# Patient Record
Sex: MALE | Race: WHITE | Employment: OTHER | URBAN - METROPOLITAN AREA
[De-identification: names, ages, dates, MRNs, and addresses within clinical notes are randomized per-mention and may not be internally consistent; named-entity substitution may affect disease eponyms.]

---

## 2017-01-03 ENCOUNTER — NURSE ONLY (OUTPATIENT)
Dept: FAMILY MEDICINE CLINIC | Facility: CLINIC | Age: 82
End: 2017-01-03

## 2017-01-03 ENCOUNTER — OFFICE VISIT (OUTPATIENT)
Dept: CARDIOLOGY CLINIC | Facility: CLINIC | Age: 82
End: 2017-01-03

## 2017-01-03 ENCOUNTER — TELEPHONE (OUTPATIENT)
Dept: FAMILY MEDICINE CLINIC | Facility: CLINIC | Age: 82
End: 2017-01-03

## 2017-01-03 VITALS
SYSTOLIC BLOOD PRESSURE: 170 MMHG | BODY MASS INDEX: 26.4 KG/M2 | DIASTOLIC BLOOD PRESSURE: 80 MMHG | HEIGHT: 63 IN | RESPIRATION RATE: 18 BRPM | WEIGHT: 149 LBS | HEART RATE: 72 BPM

## 2017-01-03 DIAGNOSIS — I42.9 CARDIOMYOPATHY, UNSPECIFIED TYPE (HCC): Primary | ICD-10-CM

## 2017-01-03 DIAGNOSIS — G63 VITAMIN B12 DEFICIENCY NEUROPATHY (HCC): ICD-10-CM

## 2017-01-03 DIAGNOSIS — E78.2 MIXED HYPERLIPIDEMIA: ICD-10-CM

## 2017-01-03 DIAGNOSIS — I10 ESSENTIAL HYPERTENSION: Chronic | ICD-10-CM

## 2017-01-03 DIAGNOSIS — I25.10 ATHEROSCLEROSIS OF NATIVE CORONARY ARTERY OF NATIVE HEART WITHOUT ANGINA PECTORIS: Primary | Chronic | ICD-10-CM

## 2017-01-03 DIAGNOSIS — E53.8 VITAMIN B12 DEFICIENCY NEUROPATHY (HCC): ICD-10-CM

## 2017-01-03 PROCEDURE — 99212 OFFICE O/P EST SF 10 MIN: CPT | Performed by: INTERNAL MEDICINE

## 2017-01-03 PROCEDURE — 99204 OFFICE O/P NEW MOD 45 MIN: CPT | Performed by: INTERNAL MEDICINE

## 2017-01-03 PROCEDURE — 96372 THER/PROPH/DIAG INJ SC/IM: CPT | Performed by: FAMILY MEDICINE

## 2017-01-03 RX ORDER — HYDROCHLOROTHIAZIDE 12.5 MG/1
12.5 CAPSULE, GELATIN COATED ORAL DAILY
Qty: 90 CAPSULE | Refills: 3 | Status: SHIPPED | OUTPATIENT
Start: 2017-01-03 | End: 2017-01-24

## 2017-01-03 RX ORDER — CYANOCOBALAMIN 1000 UG/ML
1000 INJECTION INTRAMUSCULAR; SUBCUTANEOUS
Status: SHIPPED | OUTPATIENT
Start: 2017-01-03 | End: 2017-01-31

## 2017-01-03 RX ADMIN — CYANOCOBALAMIN 1000 MCG: 1000 INJECTION INTRAMUSCULAR; SUBCUTANEOUS at 10:30:00

## 2017-01-03 NOTE — PATIENT INSTRUCTIONS
Avoid added salt  Start hydrochlorothiazide 12.5 mg daily  Check blood pressure daily and record after resting 5 minutes  Bring blood pressure machine to follow-up visit when seeing LYNNE Perez in 1 week  Get BMP blood test in 1 week  Continue other medicati

## 2017-01-03 NOTE — TELEPHONE ENCOUNTER
Requesting referral for cardiology, 2 visits. Pt has appt w/ Ana on 01/10 for 1 week f/u and appt 07/11 for 6 mo f/u w/ Dr. Patrizia Spencer.

## 2017-01-03 NOTE — PROGRESS NOTES
Patient is here for his weekly vitamin b 12 injection. Patient tolerated injection well, no adverse reaction noted. Patient is to follow up next week for another dose.      Notes Recorded by Jesus Dominguez DO on 11/29/2016 at 8:10 AM  Patient has a num

## 2017-01-03 NOTE — H&P
Thalia Cisneros is a 80year old male. HPI:   This is a pleasant 45-year-old with history of coronary disease prior MI and bare metal stent known hypertension and EF of 40-45% last seen in 2012.   He now presents for cardiac reassessment after his blood Unspecified essential hypertension    • Other and unspecified hyperlipidemia    • Myocardial infarction [facility]) 2011   • Arteriosclerosis of coronary artery    • Aortic valve regurgitation      moderate   • Mitral valve regurgitation      moderate   • Diasto should have a BMP at that time to see if diuretics can be further adjusted. If he is not tolerating diuretics or his blood pressure remains high lisinopril can be increased or  amlodipine can be added. - Basic Metabolic Panel (8); Future    3.  Mixed hype

## 2017-01-04 NOTE — TELEPHONE ENCOUNTER
Dr. [facility], please see pending referral and advise. Thanks.

## 2017-01-10 ENCOUNTER — OFFICE VISIT (OUTPATIENT)
Dept: CARDIOLOGY CLINIC | Facility: CLINIC | Age: 82
End: 2017-01-10

## 2017-01-10 ENCOUNTER — NURSE ONLY (OUTPATIENT)
Dept: FAMILY MEDICINE CLINIC | Facility: CLINIC | Age: 82
End: 2017-01-10

## 2017-01-10 VITALS
BODY MASS INDEX: 26.58 KG/M2 | DIASTOLIC BLOOD PRESSURE: 60 MMHG | WEIGHT: 150 LBS | HEIGHT: 63 IN | HEART RATE: 72 BPM | SYSTOLIC BLOOD PRESSURE: 152 MMHG

## 2017-01-10 DIAGNOSIS — E53.8 VITAMIN B12 DEFICIENCY: Primary | ICD-10-CM

## 2017-01-10 DIAGNOSIS — I10 ESSENTIAL HYPERTENSION: Primary | Chronic | ICD-10-CM

## 2017-01-10 PROCEDURE — 99213 OFFICE O/P EST LOW 20 MIN: CPT | Performed by: NURSE PRACTITIONER

## 2017-01-10 PROCEDURE — 96372 THER/PROPH/DIAG INJ SC/IM: CPT | Performed by: FAMILY MEDICINE

## 2017-01-10 PROCEDURE — 99212 OFFICE O/P EST SF 10 MIN: CPT | Performed by: NURSE PRACTITIONER

## 2017-01-10 RX ADMIN — CYANOCOBALAMIN 1000 MCG: 1000 INJECTION INTRAMUSCULAR; SUBCUTANEOUS at 10:04:00

## 2017-01-10 NOTE — PATIENT INSTRUCTIONS
1. Blood test then if its ok will increase HCTZ to 25mg daily  2. Check BP daily after sitting quietly   3.  Low salt diet

## 2017-01-10 NOTE — PROGRESS NOTES
Allison Kamara is a 80year old male. Patient presents with: Follow - Up    HPI:   Patient comes in today for a checkup of for his blood pressure. He has a history of coronary disease with bare-metal stent EF around 40-45% lasting 2012.   He had an echo artery    • Aortic valve regurgitation      moderate   • Mitral valve regurgitation      moderate   • Diastolic dysfunction      grade 1   • Cardiomyopathy (HCC)      ejection fraction 40-45%      Social History:    Smoking Status: Never Smoker

## 2017-01-11 ENCOUNTER — APPOINTMENT (OUTPATIENT)
Dept: LAB | Age: 82
End: 2017-01-11
Attending: INTERNAL MEDICINE
Payer: MEDICAID

## 2017-01-11 DIAGNOSIS — I10 ESSENTIAL HYPERTENSION: Chronic | ICD-10-CM

## 2017-01-11 DIAGNOSIS — E78.2 MIXED HYPERLIPIDEMIA: ICD-10-CM

## 2017-01-11 DIAGNOSIS — I25.10 ATHEROSCLEROSIS OF NATIVE CORONARY ARTERY OF NATIVE HEART WITHOUT ANGINA PECTORIS: Chronic | ICD-10-CM

## 2017-01-11 LAB
ANION GAP SERPL CALC-SCNC: 9 MMOL/L (ref 0–18)
BUN SERPL-MCNC: 11 MG/DL (ref 8–20)
BUN/CREAT SERPL: 13.3 (ref 10–20)
CALCIUM SERPL-MCNC: 9.2 MG/DL (ref 8.5–10.5)
CHLORIDE SERPL-SCNC: 97 MMOL/L (ref 95–110)
CO2 SERPL-SCNC: 28 MMOL/L (ref 22–32)
CREAT SERPL-MCNC: 0.83 MG/DL (ref 0.5–1.5)
GLUCOSE SERPL-MCNC: 119 MG/DL (ref 70–99)
OSMOLALITY UR CALC.SUM OF ELEC: 279 MOSM/KG (ref 275–295)
POTASSIUM SERPL-SCNC: 4.2 MMOL/L (ref 3.3–5.1)
SODIUM SERPL-SCNC: 134 MMOL/L (ref 136–144)

## 2017-01-11 PROCEDURE — 36415 COLL VENOUS BLD VENIPUNCTURE: CPT

## 2017-01-11 PROCEDURE — 80048 BASIC METABOLIC PNL TOTAL CA: CPT

## 2017-01-13 ENCOUNTER — TELEPHONE (OUTPATIENT)
Dept: CARDIOLOGY CLINIC | Facility: CLINIC | Age: 82
End: 2017-01-13

## 2017-01-13 DIAGNOSIS — E87.1 SODIUM (NA) DEFICIENCY: Primary | ICD-10-CM

## 2017-01-13 NOTE — TELEPHONE ENCOUNTER
----- Message from LYNNE Gunter sent at 1/13/2017 10:25 AM CST -----  Increase HCTZ to 25mg and repeat BMP again in 1 wk, will need to closely watch tomasz Canchola call daughter with results number in [facility]

## 2017-01-16 RX ORDER — HYDROCHLOROTHIAZIDE 12.5 MG/1
12.5 CAPSULE, GELATIN COATED ORAL 2 TIMES DAILY
Qty: 90 CAPSULE | Refills: 3 | Status: CANCELLED
Start: 2017-01-16

## 2017-01-17 ENCOUNTER — NURSE ONLY (OUTPATIENT)
Dept: FAMILY MEDICINE CLINIC | Facility: CLINIC | Age: 82
End: 2017-01-17

## 2017-01-17 DIAGNOSIS — E53.8 VITAMIN B 12 DEFICIENCY: Primary | ICD-10-CM

## 2017-01-17 PROCEDURE — 96372 THER/PROPH/DIAG INJ SC/IM: CPT | Performed by: FAMILY MEDICINE

## 2017-01-17 RX ADMIN — CYANOCOBALAMIN 1000 MCG: 1000 INJECTION INTRAMUSCULAR; SUBCUTANEOUS at 15:23:00

## 2017-01-17 NOTE — PROGRESS NOTES
Patient is here for his vitamin b-12 injection, verified name,, and medication.  Patient tolerated injection well

## 2017-01-24 ENCOUNTER — APPOINTMENT (OUTPATIENT)
Dept: LAB | Age: 82
End: 2017-01-24
Attending: INTERNAL MEDICINE
Payer: MEDICAID

## 2017-01-24 ENCOUNTER — OFFICE VISIT (OUTPATIENT)
Dept: FAMILY MEDICINE CLINIC | Facility: CLINIC | Age: 82
End: 2017-01-24

## 2017-01-24 VITALS
WEIGHT: 148 LBS | BODY MASS INDEX: 26 KG/M2 | HEART RATE: 67 BPM | DIASTOLIC BLOOD PRESSURE: 68 MMHG | SYSTOLIC BLOOD PRESSURE: 147 MMHG

## 2017-01-24 DIAGNOSIS — I10 ESSENTIAL HYPERTENSION WITH GOAL BLOOD PRESSURE LESS THAN 130/80: Primary | ICD-10-CM

## 2017-01-24 DIAGNOSIS — I42.9 CARDIOMYOPATHY, UNSPECIFIED TYPE (HCC): ICD-10-CM

## 2017-01-24 DIAGNOSIS — G57.93 NEUROPATHY OF BOTH FEET: ICD-10-CM

## 2017-01-24 DIAGNOSIS — E87.1 SODIUM (NA) DEFICIENCY: ICD-10-CM

## 2017-01-24 DIAGNOSIS — E78.2 MIXED HYPERLIPIDEMIA: ICD-10-CM

## 2017-01-24 LAB
ANION GAP SERPL CALC-SCNC: 9 MMOL/L (ref 0–18)
BUN SERPL-MCNC: 13 MG/DL (ref 8–20)
BUN/CREAT SERPL: 14.4 (ref 10–20)
CALCIUM SERPL-MCNC: 9.5 MG/DL (ref 8.5–10.5)
CHLORIDE SERPL-SCNC: 95 MMOL/L (ref 95–110)
CO2 SERPL-SCNC: 29 MMOL/L (ref 22–32)
CREAT SERPL-MCNC: 0.9 MG/DL (ref 0.5–1.5)
GLUCOSE SERPL-MCNC: 91 MG/DL (ref 70–99)
OSMOLALITY UR CALC.SUM OF ELEC: 276 MOSM/KG (ref 275–295)
POTASSIUM SERPL-SCNC: 3.6 MMOL/L (ref 3.3–5.1)
SODIUM SERPL-SCNC: 133 MMOL/L (ref 136–144)

## 2017-01-24 PROCEDURE — 99212 OFFICE O/P EST SF 10 MIN: CPT | Performed by: FAMILY MEDICINE

## 2017-01-24 PROCEDURE — 36415 COLL VENOUS BLD VENIPUNCTURE: CPT

## 2017-01-24 PROCEDURE — 99214 OFFICE O/P EST MOD 30 MIN: CPT | Performed by: FAMILY MEDICINE

## 2017-01-24 PROCEDURE — 80048 BASIC METABOLIC PNL TOTAL CA: CPT

## 2017-01-24 RX ORDER — CLOPIDOGREL BISULFATE 75 MG/1
75 TABLET ORAL
Qty: 90 TABLET | Refills: 3 | Status: SHIPPED | OUTPATIENT
Start: 2017-01-24 | End: 2018-02-25

## 2017-01-24 RX ORDER — ENALAPRIL MALEATE 10 MG/1
10 TABLET ORAL 2 TIMES DAILY
Qty: 180 TABLET | Refills: 3 | Status: SHIPPED | OUTPATIENT
Start: 2017-01-24 | End: 2018-04-09

## 2017-01-24 RX ORDER — METOPROLOL SUCCINATE 100 MG/1
100 TABLET, EXTENDED RELEASE ORAL DAILY
Qty: 90 TABLET | Refills: 3 | Status: SHIPPED | OUTPATIENT
Start: 2017-01-24 | End: 2018-03-30

## 2017-01-24 RX ORDER — SIMVASTATIN 40 MG
40 TABLET ORAL NIGHTLY
Qty: 90 TABLET | Refills: 3 | Status: SHIPPED | OUTPATIENT
Start: 2017-01-24 | End: 2018-04-09

## 2017-01-24 RX ORDER — HYDROCHLOROTHIAZIDE 25 MG/1
25 TABLET ORAL DAILY
Qty: 90 TABLET | Refills: 3 | Status: SHIPPED | OUTPATIENT
Start: 2017-01-24 | End: 2017-01-24

## 2017-01-24 RX ORDER — AMITRIPTYLINE HYDROCHLORIDE 10 MG/1
TABLET, FILM COATED ORAL
Qty: 90 TABLET | Refills: 3 | Status: SHIPPED | OUTPATIENT
Start: 2017-01-24 | End: 2018-02-25

## 2017-01-24 RX ORDER — HYDROCHLOROTHIAZIDE 25 MG/1
25 TABLET ORAL DAILY
Qty: 90 TABLET | Refills: 3 | Status: SHIPPED | OUTPATIENT
Start: 2017-01-24 | End: 2018-03-30

## 2017-01-24 RX ORDER — RANITIDINE 300 MG/1
300 TABLET ORAL NIGHTLY
Qty: 90 TABLET | Refills: 3 | Status: SHIPPED | OUTPATIENT
Start: 2017-01-24 | End: 2017-09-22

## 2017-01-24 NOTE — PROGRESS NOTES
Has some balance issues. bp bertter. Cards note appreciated. No sob   Cp  No nausea      Patient's past medical surgical family social history was reviewed. Review of Systems  Cardiovascular: no chest pain, irregular heartbeat, or palpitations.   Cons

## 2017-04-26 ENCOUNTER — TELEPHONE (OUTPATIENT)
Dept: FAMILY MEDICINE CLINIC | Facility: CLINIC | Age: 82
End: 2017-04-26

## 2017-04-26 NOTE — TELEPHONE ENCOUNTER
Wolof speaking pt-Pt's daughter states Pt is currently in [address] since last month, states they are mailing him medication to [address].  Pt's daughter states [address] is requesting a letter that states pt is under his care, and medication he is taking.

## 2017-08-28 ENCOUNTER — OFFICE VISIT (OUTPATIENT)
Dept: FAMILY MEDICINE CLINIC | Facility: CLINIC | Age: 82
End: 2017-08-28

## 2017-08-28 VITALS
HEIGHT: 63 IN | WEIGHT: 146 LBS | DIASTOLIC BLOOD PRESSURE: 86 MMHG | HEART RATE: 69 BPM | BODY MASS INDEX: 25.87 KG/M2 | SYSTOLIC BLOOD PRESSURE: 165 MMHG

## 2017-08-28 DIAGNOSIS — E78.2 MIXED HYPERLIPIDEMIA: ICD-10-CM

## 2017-08-28 DIAGNOSIS — R20.0 BILATERAL LEG NUMBNESS: ICD-10-CM

## 2017-08-28 DIAGNOSIS — M51.36 DEGENERATIVE LUMBAR DISC: ICD-10-CM

## 2017-08-28 DIAGNOSIS — G47.30 SEVERE SLEEP APNEA: Primary | ICD-10-CM

## 2017-08-28 DIAGNOSIS — I10 ESSENTIAL HYPERTENSION WITH GOAL BLOOD PRESSURE LESS THAN 130/80: ICD-10-CM

## 2017-08-28 PROCEDURE — 99214 OFFICE O/P EST MOD 30 MIN: CPT | Performed by: FAMILY MEDICINE

## 2017-08-28 PROCEDURE — 99212 OFFICE O/P EST SF 10 MIN: CPT | Performed by: FAMILY MEDICINE

## 2017-08-28 NOTE — PROGRESS NOTES
Both legs are numb. All the time originally was just in the feet now its the whole leg. Patient has a history of lumbar disc disease:  L1-L2: No significant disc/facet abnormality, spinal stenosis, or foraminal  stenosis.   L2-L3: No significant disc/face evaluation and the patient that it is very important that he uses CPAP mask. This could be life-threatening disease as he has severe sleep apnea severe uncontrolled hypertension as result of not being compliant with the CPAP mask.   Risk for stroke heart a reflexes were two out of four at the patellas and Achilles bilaterally. Dorsalis pedis and popliteal pulses were present bilateral lower extremities. Range of motion of the lumbar spine was not limited due to discomfort.   Heart was regular rate and rhyth

## 2017-09-02 ENCOUNTER — APPOINTMENT (OUTPATIENT)
Dept: LAB | Age: 82
End: 2017-09-02
Attending: FAMILY MEDICINE
Payer: MEDICAID

## 2017-09-02 DIAGNOSIS — E78.2 MIXED HYPERLIPIDEMIA: ICD-10-CM

## 2017-09-02 LAB
ALBUMIN SERPL BCP-MCNC: 4.3 G/DL (ref 3.5–4.8)
ALBUMIN/GLOB SERPL: 1.3 {RATIO} (ref 1–2)
ALP SERPL-CCNC: 91 U/L (ref 32–100)
ALT SERPL-CCNC: 16 U/L (ref 17–63)
ANION GAP SERPL CALC-SCNC: 10 MMOL/L (ref 0–18)
AST SERPL-CCNC: 21 U/L (ref 15–41)
BILIRUB SERPL-MCNC: 0.9 MG/DL (ref 0.3–1.2)
BUN SERPL-MCNC: 14 MG/DL (ref 8–20)
BUN/CREAT SERPL: 14.9 (ref 10–20)
CALCIUM SERPL-MCNC: 9.9 MG/DL (ref 8.5–10.5)
CHLORIDE SERPL-SCNC: 99 MMOL/L (ref 95–110)
CHOLEST SERPL-MCNC: 206 MG/DL (ref 110–200)
CO2 SERPL-SCNC: 27 MMOL/L (ref 22–32)
CREAT SERPL-MCNC: 0.94 MG/DL (ref 0.5–1.5)
GLOBULIN PLAS-MCNC: 3.2 G/DL (ref 2.5–3.7)
GLUCOSE SERPL-MCNC: 113 MG/DL (ref 70–99)
HDLC SERPL-MCNC: 45 MG/DL
LDLC SERPL CALC-MCNC: 118 MG/DL (ref 0–99)
NONHDLC SERPL-MCNC: 161 MG/DL
OSMOLALITY UR CALC.SUM OF ELEC: 283 MOSM/KG (ref 275–295)
POTASSIUM SERPL-SCNC: 4 MMOL/L (ref 3.3–5.1)
PROT SERPL-MCNC: 7.5 G/DL (ref 5.9–8.4)
SODIUM SERPL-SCNC: 136 MMOL/L (ref 136–144)
TRIGL SERPL-MCNC: 215 MG/DL (ref 1–149)

## 2017-09-02 PROCEDURE — 80053 COMPREHEN METABOLIC PANEL: CPT

## 2017-09-02 PROCEDURE — 80061 LIPID PANEL: CPT

## 2017-09-02 PROCEDURE — 36415 COLL VENOUS BLD VENIPUNCTURE: CPT

## 2017-09-06 ENCOUNTER — TELEPHONE (OUTPATIENT)
Dept: OTHER | Age: 82
End: 2017-09-06

## 2017-09-06 NOTE — TELEPHONE ENCOUNTER
Notes Recorded by Tarsha May DO on 9/5/2017 at 12:50 PM CDT  Cholesterol is still high.  Confirm the patient has been taking his cholesterol medication nightly.  Blood sugar is mildly elevated as before.  Liver kidney tests are fine.  Repeat same

## 2017-09-06 NOTE — TELEPHONE ENCOUNTER
With Divehi interpretor advised son Shelly Mando (hipaa on file) of Dr. Tessie Chang note. Son verbalized understanding. Son states patient takes 3 medications every night. Son will verify with patient which medications he takes at night and will let us know.

## 2017-09-22 ENCOUNTER — OFFICE VISIT (OUTPATIENT)
Dept: FAMILY MEDICINE CLINIC | Facility: CLINIC | Age: 82
End: 2017-09-22

## 2017-09-22 VITALS
BODY MASS INDEX: 26 KG/M2 | SYSTOLIC BLOOD PRESSURE: 133 MMHG | WEIGHT: 147 LBS | HEART RATE: 63 BPM | TEMPERATURE: 98 F | DIASTOLIC BLOOD PRESSURE: 64 MMHG

## 2017-09-22 DIAGNOSIS — K21.9 GASTROESOPHAGEAL REFLUX DISEASE, ESOPHAGITIS PRESENCE NOT SPECIFIED: Primary | ICD-10-CM

## 2017-09-22 DIAGNOSIS — I10 ESSENTIAL HYPERTENSION WITH GOAL BLOOD PRESSURE LESS THAN 130/80: ICD-10-CM

## 2017-09-22 DIAGNOSIS — Z23 NEED FOR VACCINATION: ICD-10-CM

## 2017-09-22 PROCEDURE — 90653 IIV ADJUVANT VACCINE IM: CPT | Performed by: FAMILY MEDICINE

## 2017-09-22 PROCEDURE — 99214 OFFICE O/P EST MOD 30 MIN: CPT | Performed by: FAMILY MEDICINE

## 2017-09-22 PROCEDURE — 99212 OFFICE O/P EST SF 10 MIN: CPT | Performed by: FAMILY MEDICINE

## 2017-09-22 PROCEDURE — 90471 IMMUNIZATION ADMIN: CPT | Performed by: FAMILY MEDICINE

## 2017-09-22 RX ORDER — PANTOPRAZOLE SODIUM 20 MG/1
20 TABLET, DELAYED RELEASE ORAL
Qty: 90 TABLET | Refills: 1 | Status: SHIPPED | OUTPATIENT
Start: 2017-09-22 | End: 2017-10-06

## 2017-09-22 NOTE — PROGRESS NOTES
Epigastric pain  Taking ranitidine   Not a lot of coffe. No sob    Tolerating his mediciaton for bp  No chest pain. Patient's past medical surgical family social history was reviewed.     Review of Systems  Allergic: no environmental allergies or food

## 2017-10-06 ENCOUNTER — APPOINTMENT (OUTPATIENT)
Dept: LAB | Age: 82
End: 2017-10-06
Attending: FAMILY MEDICINE
Payer: MEDICAID

## 2017-10-06 ENCOUNTER — OFFICE VISIT (OUTPATIENT)
Dept: FAMILY MEDICINE CLINIC | Facility: CLINIC | Age: 82
End: 2017-10-06

## 2017-10-06 VITALS
HEART RATE: 61 BPM | BODY MASS INDEX: 25 KG/M2 | SYSTOLIC BLOOD PRESSURE: 155 MMHG | WEIGHT: 143 LBS | DIASTOLIC BLOOD PRESSURE: 72 MMHG

## 2017-10-06 DIAGNOSIS — I25.10 ATHEROSCLEROSIS OF NATIVE CORONARY ARTERY OF NATIVE HEART WITHOUT ANGINA PECTORIS: Chronic | ICD-10-CM

## 2017-10-06 DIAGNOSIS — R10.13 EPIGASTRIC PAIN: ICD-10-CM

## 2017-10-06 DIAGNOSIS — I42.9 CARDIOMYOPATHY, UNSPECIFIED TYPE (HCC): ICD-10-CM

## 2017-10-06 DIAGNOSIS — K21.9 GASTROESOPHAGEAL REFLUX DISEASE, ESOPHAGITIS PRESENCE NOT SPECIFIED: Primary | ICD-10-CM

## 2017-10-06 DIAGNOSIS — I34.0 MITRAL VALVE INSUFFICIENCY, UNSPECIFIED ETIOLOGY: ICD-10-CM

## 2017-10-06 PROCEDURE — 93005 ELECTROCARDIOGRAM TRACING: CPT

## 2017-10-06 PROCEDURE — 93010 ELECTROCARDIOGRAM REPORT: CPT | Performed by: FAMILY MEDICINE

## 2017-10-06 PROCEDURE — 99212 OFFICE O/P EST SF 10 MIN: CPT | Performed by: FAMILY MEDICINE

## 2017-10-06 PROCEDURE — 99214 OFFICE O/P EST MOD 30 MIN: CPT | Performed by: FAMILY MEDICINE

## 2017-10-06 RX ORDER — PANTOPRAZOLE SODIUM 40 MG/1
40 TABLET, DELAYED RELEASE ORAL
Qty: 90 TABLET | Refills: 0 | Status: SHIPPED | OUTPATIENT
Start: 2017-10-06 | End: 2017-10-17

## 2017-10-06 NOTE — PROGRESS NOTES
Epigastric pain   bettter but sitl present  Worse after eating protonix 20mg in am    Hx cad and cardiomyopathy  No sob  Hx of valve disease      Patient's past medical surgical family social history was reviewed.     Review of Systems  Allergic: no environ native heart without angina pectoris  Cardio referral  - CARDIO - INTERNAL    5. Mitral valve insufficiency, unspecified etiology  Stable.   - CARDIO - INTERNAL

## 2017-10-14 ENCOUNTER — TELEPHONE (OUTPATIENT)
Dept: OTHER | Age: 82
End: 2017-10-14

## 2017-10-14 DIAGNOSIS — K21.9 GASTROESOPHAGEAL REFLUX DISEASE, ESOPHAGITIS PRESENCE NOT SPECIFIED: ICD-10-CM

## 2017-10-14 DIAGNOSIS — R10.13 EPIGASTRIC PAIN: ICD-10-CM

## 2017-10-14 NOTE — TELEPHONE ENCOUNTER
Notes Recorded by Geovanni Corrigan DO on 10/10/2017 at 7:53 AM CDT  EKG was stable. Pamella Mace the patient follow-up with cardiology. [facility] see Dr. Jess Hills or 1 of his partners.     LMTCB Please transfer to triage

## 2017-10-17 RX ORDER — PANTOPRAZOLE SODIUM 40 MG/1
40 TABLET, DELAYED RELEASE ORAL
Qty: 90 TABLET | Refills: 0 | Status: SHIPPED | OUTPATIENT
Start: 2017-10-17 | End: 2018-03-30

## 2017-10-17 NOTE — TELEPHONE ENCOUNTER
Relayed dr message to Pt's son,also mentioned a rx was suppose to be sent-rx resent with message dosage increased

## 2017-10-17 NOTE — TELEPHONE ENCOUNTER
LMTCB transfer to triage      DR HUBBARD,  Left messages to the patient with no return call   , do you want us to send a no phone response mail? Please advise.

## 2017-10-27 ENCOUNTER — OFFICE VISIT (OUTPATIENT)
Dept: GASTROENTEROLOGY | Facility: CLINIC | Age: 82
End: 2017-10-27

## 2017-10-27 VITALS
SYSTOLIC BLOOD PRESSURE: 158 MMHG | HEIGHT: 63 IN | DIASTOLIC BLOOD PRESSURE: 71 MMHG | HEART RATE: 69 BPM | WEIGHT: 151 LBS | BODY MASS INDEX: 26.75 KG/M2

## 2017-10-27 DIAGNOSIS — K59.00 CONSTIPATION, UNSPECIFIED CONSTIPATION TYPE: ICD-10-CM

## 2017-10-27 DIAGNOSIS — K21.9 GASTROESOPHAGEAL REFLUX DISEASE, ESOPHAGITIS PRESENCE NOT SPECIFIED: Primary | ICD-10-CM

## 2017-10-27 PROCEDURE — 99212 OFFICE O/P EST SF 10 MIN: CPT | Performed by: INTERNAL MEDICINE

## 2017-10-27 PROCEDURE — 99203 OFFICE O/P NEW LOW 30 MIN: CPT | Performed by: INTERNAL MEDICINE

## 2017-10-27 NOTE — H&P
[facility], [facility] - Gastroenterology                                                                                                  Clinic History and Physical Smokeless tobacco: Never Used                      Alcohol use:  No                 Medications (Active prior to today's visit):    Current Outpatient Prescriptions:  Pantoprazole Sodium 40 MG Oral Tab EC Take 1 tablet likely GERD related contributed by his hiatal hernia and constipation. Would not recommend surgery given his age and comorbidities.     Discussed with him using a  the nature of his problem which is likely multifactorial as above from

## 2017-10-27 NOTE — PATIENT INSTRUCTIONS
1. Take pantoprazole/protonix once before breakfast and once before dinner - it's best if you take it 30 minutes before eating but ok to take it after if you forget. Let me know if you run out of the medication    2.  Start taking miralax    Miralax  Sta

## 2017-12-05 ENCOUNTER — OFFICE VISIT (OUTPATIENT)
Dept: CARDIOLOGY CLINIC | Facility: CLINIC | Age: 82
End: 2017-12-05

## 2017-12-05 VITALS
BODY MASS INDEX: 27 KG/M2 | RESPIRATION RATE: 16 BRPM | SYSTOLIC BLOOD PRESSURE: 106 MMHG | DIASTOLIC BLOOD PRESSURE: 62 MMHG | HEART RATE: 64 BPM | WEIGHT: 153 LBS

## 2017-12-05 DIAGNOSIS — I10 ESSENTIAL HYPERTENSION: Chronic | ICD-10-CM

## 2017-12-05 DIAGNOSIS — E78.2 MIXED HYPERLIPIDEMIA: ICD-10-CM

## 2017-12-05 DIAGNOSIS — I25.10 ATHEROSCLEROSIS OF NATIVE CORONARY ARTERY OF NATIVE HEART WITHOUT ANGINA PECTORIS: Primary | Chronic | ICD-10-CM

## 2017-12-05 PROCEDURE — 99214 OFFICE O/P EST MOD 30 MIN: CPT | Performed by: INTERNAL MEDICINE

## 2017-12-05 PROCEDURE — 99212 OFFICE O/P EST SF 10 MIN: CPT | Performed by: INTERNAL MEDICINE

## 2017-12-05 NOTE — PROGRESS NOTES
Swapna Stephenson is a 80year old male. Patient presents with: Follow - Up: stomach pain, Legs numb    HPI:   This is a pleasant 51-year-old with coronary disease prior MI stent elevated cholesterol hypertension who presents for follow-up.   History is obt Smokeless tobacco: Never Used                      Alcohol use:  No                 REVIEW OF SYSTEMS:   GENERAL HEALTH: feels well otherwise  SKIN: denies any unusual skin lesions or rashes  RESPIRATORY: denies shortness of breath with exertion  CARDIO

## 2018-01-10 ENCOUNTER — NURSE TRIAGE (OUTPATIENT)
Dept: OTHER | Age: 83
End: 2018-01-10

## 2018-01-11 NOTE — TELEPHONE ENCOUNTER
HIPAA verified, contact patient's son Marlon Miller, patient was admitted to [facility] awaiting Infectious Disease consult.  Advised family member to call and make follow up appt with Dr Anjana Cruz within one week of hospital discharge, and he agr

## 2018-01-11 NOTE — TELEPHONE ENCOUNTER
Action Requested: Summary for Provider     []  Critical Lab, Recommendations Needed  [] Need Additional Advice  [x]   FYI    []   Need Orders  [] Need Medications Sent to Pharmacy  []  Other     SUMMARY: Grandson called reports dilma was on couch today a

## 2018-01-15 ENCOUNTER — TELEPHONE (OUTPATIENT)
Dept: OTHER | Age: 83
End: 2018-01-15

## 2018-01-15 NOTE — TELEPHONE ENCOUNTER
Spoke with son Sulema Jacoby (NICHOLAS verified)-requesting hospital f/u appt (brandee states patient was admitted to [facility] from 1/10/18-1/13/18 for dehydration and kidney infection).  Son states patient is eating, drinking and voiding adequately-c/o headache at t

## 2018-01-16 ENCOUNTER — OFFICE VISIT (OUTPATIENT)
Dept: FAMILY MEDICINE CLINIC | Facility: CLINIC | Age: 83
End: 2018-01-16

## 2018-01-16 ENCOUNTER — APPOINTMENT (OUTPATIENT)
Dept: LAB | Age: 83
End: 2018-01-16
Attending: FAMILY MEDICINE
Payer: MEDICAID

## 2018-01-16 ENCOUNTER — TELEPHONE (OUTPATIENT)
Dept: OTHER | Age: 83
End: 2018-01-16

## 2018-01-16 VITALS
SYSTOLIC BLOOD PRESSURE: 146 MMHG | DIASTOLIC BLOOD PRESSURE: 81 MMHG | WEIGHT: 151.38 LBS | BODY MASS INDEX: 26.82 KG/M2 | HEIGHT: 63 IN | HEART RATE: 67 BPM

## 2018-01-16 DIAGNOSIS — R51.9 CHRONIC NONINTRACTABLE HEADACHE, UNSPECIFIED HEADACHE TYPE: ICD-10-CM

## 2018-01-16 DIAGNOSIS — G89.29 CHRONIC NONINTRACTABLE HEADACHE, UNSPECIFIED HEADACHE TYPE: Primary | ICD-10-CM

## 2018-01-16 DIAGNOSIS — G89.29 CHRONIC NONINTRACTABLE HEADACHE, UNSPECIFIED HEADACHE TYPE: ICD-10-CM

## 2018-01-16 DIAGNOSIS — R51.9 CHRONIC NONINTRACTABLE HEADACHE, UNSPECIFIED HEADACHE TYPE: Primary | ICD-10-CM

## 2018-01-16 LAB — ERYTHROCYTE [SEDIMENTATION RATE] IN BLOOD: 38 MM/HR (ref 0–30)

## 2018-01-16 PROCEDURE — 36415 COLL VENOUS BLD VENIPUNCTURE: CPT

## 2018-01-16 PROCEDURE — 99212 OFFICE O/P EST SF 10 MIN: CPT | Performed by: FAMILY MEDICINE

## 2018-01-16 PROCEDURE — 85652 RBC SED RATE AUTOMATED: CPT

## 2018-01-16 PROCEDURE — 99213 OFFICE O/P EST LOW 20 MIN: CPT | Performed by: FAMILY MEDICINE

## 2018-01-16 RX ORDER — AMOXICILLIN AND CLAVULANATE POTASSIUM 875; 125 MG/1; MG/1
TABLET, FILM COATED ORAL
Refills: 0 | COMMUNITY
Start: 2018-01-13 | End: 2018-08-22 | Stop reason: ALTCHOICE

## 2018-01-16 NOTE — PROGRESS NOTES
Blood pressure 146/81, pulse 67, height 5' 3\" (1.6 m), weight 151 lb 6 oz (68.7 kg). Patient presents today following up for hospitalization at [facility].  He reports that he was in the hospital for 3 days. He has cellulitis of his scalp.   He

## 2018-01-17 NOTE — TELEPHONE ENCOUNTER
[address] NEED PATIENT'S RESULTS FROM RECENT INPATIENT STAY WE HAVE NICHOLAS SIGNATURE FROM PATIENT.

## 2018-01-18 NOTE — TELEPHONE ENCOUNTER
Notes Recorded by Ambrosio Lopez DO on 1/17/2018 at 6:26 PM CST  Esr results not impressive no action needed    LMTCB. Please transfer to triage.

## 2018-01-26 ENCOUNTER — TELEPHONE (OUTPATIENT)
Dept: FAMILY MEDICINE CLINIC | Facility: CLINIC | Age: 83
End: 2018-01-26

## 2018-02-28 RX ORDER — AMITRIPTYLINE HYDROCHLORIDE 10 MG/1
TABLET, FILM COATED ORAL
Qty: 30 TABLET | Refills: 2 | Status: SHIPPED | OUTPATIENT
Start: 2018-02-28 | End: 2018-04-26

## 2018-02-28 NOTE — TELEPHONE ENCOUNTER
LOV: 1-16-18 Last Rx: 1-24-17    No protocol     Please advise in regards to refill request. Thank You      Refill Protocol Appointment Criteria  · Appointment scheduled in the past 6 months or in the next 3 months  Recent Outpatient Visits            1 mo

## 2018-03-01 RX ORDER — CLOPIDOGREL BISULFATE 75 MG/1
75 TABLET ORAL
Qty: 30 TABLET | Refills: 2 | Status: SHIPPED | OUTPATIENT
Start: 2018-03-01 | End: 2018-05-18

## 2018-03-30 DIAGNOSIS — K21.9 GASTROESOPHAGEAL REFLUX DISEASE, ESOPHAGITIS PRESENCE NOT SPECIFIED: ICD-10-CM

## 2018-03-30 DIAGNOSIS — R10.13 EPIGASTRIC PAIN: ICD-10-CM

## 2018-04-03 RX ORDER — HYDROCHLOROTHIAZIDE 25 MG/1
25 TABLET ORAL DAILY
Qty: 30 TABLET | Refills: 2 | Status: SHIPPED | OUTPATIENT
Start: 2018-04-03 | End: 2018-05-18

## 2018-04-03 RX ORDER — PANTOPRAZOLE SODIUM 40 MG/1
TABLET, DELAYED RELEASE ORAL
Qty: 30 TABLET | Refills: 2 | Status: SHIPPED | OUTPATIENT
Start: 2018-04-03 | End: 2018-05-18

## 2018-04-03 RX ORDER — METOPROLOL SUCCINATE 100 MG/1
100 TABLET, EXTENDED RELEASE ORAL DAILY
Qty: 30 TABLET | Refills: 2 | Status: SHIPPED | OUTPATIENT
Start: 2018-04-03 | End: 2018-05-18

## 2018-04-03 NOTE — TELEPHONE ENCOUNTER
Hypertensive Medications  Protocol Criteria:  · Appointment scheduled in the past 6 months or in the next 3 months  · BMP or CMP in the past 12 months  · Creatinine result < 2  Recent Outpatient Visits            2 months ago Chronic nonintractable headach angina pectoris    [facility] Isauro Ulrich MD    Office Visit    5 months ago Gastroesophageal reflux disease, esophagitis presence not specified    Gibran Silverman, Lidia Hendrickson MD    Office

## 2018-04-11 ENCOUNTER — TELEPHONE (OUTPATIENT)
Dept: FAMILY MEDICINE CLINIC | Facility: CLINIC | Age: 83
End: 2018-04-11

## 2018-04-11 RX ORDER — ENALAPRIL MALEATE 10 MG/1
TABLET ORAL
Qty: 180 TABLET | Refills: 0 | Status: SHIPPED | OUTPATIENT
Start: 2018-04-11 | End: 2018-05-18

## 2018-04-11 RX ORDER — SIMVASTATIN 40 MG
40 TABLET ORAL NIGHTLY
Qty: 90 TABLET | Refills: 0 | Status: SHIPPED | OUTPATIENT
Start: 2018-04-11 | End: 2018-05-18

## 2018-04-11 NOTE — TELEPHONE ENCOUNTER
Refilled per Epic protocol.     Cholesterol Medications  Protocol Criteria:  · Appointment scheduled in the past 12 months or in the next 3 months  · ALT & LDL on file in the past 12 months  · ALT result < 80  · LDL result <130   Recent Outpatient Visits ago Gastroesophageal reflux disease, esophagitis presence not specified    [address], [address], [address] [place] [address] Visit    6 months ago Gastroesophageal reflux disease, esophagitis presence not specified    [address],

## 2018-04-11 NOTE — TELEPHONE ENCOUNTER
PA for Pantoprazole sodium 40 mg tab completed with [employer] via CMM response time 3-5 business days KEY [id number].

## 2018-04-12 NOTE — TELEPHONE ENCOUNTER
PA denied due to quantity limits. Plan states patient is able to get up to 2 tablets a day for a maximum of 120 days within 365 days.

## 2018-04-16 NOTE — TELEPHONE ENCOUNTER
He can try an over the counter omeprazole 20 mg for the 2nd dose PPI of if he cannot afford this try an H2 blocker for the 2nd dose.     Renita Amin MD  [facility], [facility] - Gastroenterology  4/16/2018  11:19 AM

## 2018-04-30 RX ORDER — AMITRIPTYLINE HYDROCHLORIDE 10 MG/1
TABLET, FILM COATED ORAL
Qty: 90 TABLET | Refills: 0 | Status: SHIPPED | OUTPATIENT
Start: 2018-04-30 | End: 2018-07-02

## 2018-04-30 NOTE — TELEPHONE ENCOUNTER
Refilled per Epic protocol.     Refill Protocol Appointment Criteria  for Psychiatric Non-Scheduled (Anti-Anxiety)    · Appointment scheduled in the past 6 months or in the next 3 months  Recent Outpatient Visits            3 months ago Chronic nonintractab

## 2018-05-18 ENCOUNTER — OFFICE VISIT (OUTPATIENT)
Dept: FAMILY MEDICINE CLINIC | Facility: CLINIC | Age: 83
End: 2018-05-18

## 2018-05-18 VITALS
BODY MASS INDEX: 26 KG/M2 | HEART RATE: 62 BPM | SYSTOLIC BLOOD PRESSURE: 145 MMHG | DIASTOLIC BLOOD PRESSURE: 73 MMHG | WEIGHT: 147 LBS | TEMPERATURE: 98 F

## 2018-05-18 DIAGNOSIS — K21.9 GASTROESOPHAGEAL REFLUX DISEASE, ESOPHAGITIS PRESENCE NOT SPECIFIED: ICD-10-CM

## 2018-05-18 DIAGNOSIS — I42.9 CARDIOMYOPATHY, UNSPECIFIED TYPE (HCC): ICD-10-CM

## 2018-05-18 DIAGNOSIS — E78.2 MIXED HYPERLIPIDEMIA: ICD-10-CM

## 2018-05-18 DIAGNOSIS — R10.13 EPIGASTRIC PAIN: ICD-10-CM

## 2018-05-18 DIAGNOSIS — I10 ESSENTIAL HYPERTENSION WITH GOAL BLOOD PRESSURE LESS THAN 130/80: Primary | ICD-10-CM

## 2018-05-18 DIAGNOSIS — I34.0 MODERATE MITRAL REGURGITATION: ICD-10-CM

## 2018-05-18 PROCEDURE — 99212 OFFICE O/P EST SF 10 MIN: CPT | Performed by: FAMILY MEDICINE

## 2018-05-18 PROCEDURE — 99214 OFFICE O/P EST MOD 30 MIN: CPT | Performed by: FAMILY MEDICINE

## 2018-05-18 RX ORDER — PANTOPRAZOLE SODIUM 40 MG/1
40 TABLET, DELAYED RELEASE ORAL
Qty: 90 TABLET | Refills: 11 | Status: SHIPPED | OUTPATIENT
Start: 2018-05-18 | End: 2019-05-18

## 2018-05-18 RX ORDER — METOPROLOL SUCCINATE 100 MG/1
100 TABLET, EXTENDED RELEASE ORAL DAILY
Qty: 90 TABLET | Refills: 3 | Status: SHIPPED | OUTPATIENT
Start: 2018-05-18 | End: 2018-08-30

## 2018-05-18 RX ORDER — SIMVASTATIN 40 MG
40 TABLET ORAL NIGHTLY
Qty: 90 TABLET | Refills: 3 | Status: SHIPPED | OUTPATIENT
Start: 2018-05-18 | End: 2018-11-06

## 2018-05-18 RX ORDER — ENALAPRIL MALEATE 10 MG/1
10 TABLET ORAL 2 TIMES DAILY
Qty: 60 TABLET | Refills: 11 | Status: CANCELLED | OUTPATIENT
Start: 2018-05-18 | End: 2019-05-18

## 2018-05-18 RX ORDER — ENALAPRIL MALEATE 20 MG/1
20 TABLET ORAL DAILY
Qty: 90 TABLET | Refills: 3 | Status: SHIPPED | OUTPATIENT
Start: 2018-05-18 | End: 2018-08-04

## 2018-05-18 RX ORDER — CLOPIDOGREL BISULFATE 75 MG/1
75 TABLET ORAL
Qty: 90 TABLET | Refills: 11 | Status: SHIPPED | OUTPATIENT
Start: 2018-05-18 | End: 2018-11-09

## 2018-05-18 RX ORDER — HYDROCHLOROTHIAZIDE 25 MG/1
25 TABLET ORAL DAILY
Qty: 90 TABLET | Refills: 11 | Status: SHIPPED | OUTPATIENT
Start: 2018-05-18 | End: 2019-05-18

## 2018-05-18 NOTE — PROGRESS NOTES
\"I feel good. \"  No sob no chest pain. Patient's past medical surgical family social history was reviewed.     Review of Systems  Allergic: no environmental allergies or food allergies  Cardiovascular: no chest pain, irregular heartbeat, or palpitations pressure less than 130/80  Stable. Increase ace  - CBC WITH DIFFERENTIAL WITH PLATELET; Future  - LIPID PANEL; Future  - COMP METABOLIC PANEL (14); Future  - CARD ECHO 2D DOPPLER (CPT=93306); Future    4.  Cardiomyopathy, unspecified type ([address]  Check echo

## 2018-05-19 ENCOUNTER — LAB ENCOUNTER (OUTPATIENT)
Dept: LAB | Age: 83
End: 2018-05-19
Attending: FAMILY MEDICINE
Payer: MEDICAID

## 2018-05-19 DIAGNOSIS — E78.2 MIXED HYPERLIPIDEMIA: ICD-10-CM

## 2018-05-19 DIAGNOSIS — I10 ESSENTIAL HYPERTENSION WITH GOAL BLOOD PRESSURE LESS THAN 130/80: ICD-10-CM

## 2018-05-19 DIAGNOSIS — I42.9 CARDIOMYOPATHY, UNSPECIFIED TYPE (HCC): ICD-10-CM

## 2018-05-19 DIAGNOSIS — I34.0 MODERATE MITRAL REGURGITATION: ICD-10-CM

## 2018-05-19 PROCEDURE — 80053 COMPREHEN METABOLIC PANEL: CPT

## 2018-05-19 PROCEDURE — 80061 LIPID PANEL: CPT

## 2018-05-19 PROCEDURE — 36415 COLL VENOUS BLD VENIPUNCTURE: CPT

## 2018-05-19 PROCEDURE — 85025 COMPLETE CBC W/AUTO DIFF WBC: CPT

## 2018-05-24 ENCOUNTER — TELEPHONE (OUTPATIENT)
Dept: FAMILY MEDICINE CLINIC | Facility: CLINIC | Age: 83
End: 2018-05-24

## 2018-05-24 DIAGNOSIS — R73.03 PREDIABETES: Primary | ICD-10-CM

## 2018-05-24 NOTE — TELEPHONE ENCOUNTER
----- Message from Yris Vallejo DO sent at 5/21/2018  9:27 AM CDT -----  Chol good  Sugar high  Repeat sugar 1 mo  fbs and hba1c dx prediabetes.

## 2018-05-25 ENCOUNTER — TELEPHONE (OUTPATIENT)
Dept: FAMILY MEDICINE CLINIC | Facility: CLINIC | Age: 83
End: 2018-05-25

## 2018-05-25 NOTE — TELEPHONE ENCOUNTER
PA for Pantoprazole 40 mg tab completed with [employer] via CMM response time 3-5 business days KEY [id number].

## 2018-06-04 NOTE — TELEPHONE ENCOUNTER
Spoke to son, Milly Corbin who was given permission by patient to speak with me. Explained his glucose is high at 136. Will need to repeat labs by June 19th. Will need to be fasting. Orders are in chart.

## 2018-06-06 NOTE — TELEPHONE ENCOUNTER
Patient has reached the maximum allowed for his plan. Patient could pay out of pocket for medication. Please advise.

## 2018-06-15 ENCOUNTER — LAB ENCOUNTER (OUTPATIENT)
Dept: LAB | Age: 83
End: 2018-06-15
Attending: FAMILY MEDICINE
Payer: MEDICAID

## 2018-06-15 ENCOUNTER — TELEPHONE (OUTPATIENT)
Dept: FAMILY MEDICINE CLINIC | Facility: CLINIC | Age: 83
End: 2018-06-15

## 2018-06-15 ENCOUNTER — OFFICE VISIT (OUTPATIENT)
Dept: FAMILY MEDICINE CLINIC | Facility: CLINIC | Age: 83
End: 2018-06-15

## 2018-06-15 ENCOUNTER — HOSPITAL ENCOUNTER (OUTPATIENT)
Dept: CT IMAGING | Facility: HOSPITAL | Age: 83
Discharge: HOME | End: 2018-06-15
Attending: FAMILY MEDICINE
Payer: MEDICAID

## 2018-06-15 VITALS
HEIGHT: 63 IN | SYSTOLIC BLOOD PRESSURE: 171 MMHG | WEIGHT: 148 LBS | HEART RATE: 76 BPM | BODY MASS INDEX: 26.22 KG/M2 | DIASTOLIC BLOOD PRESSURE: 81 MMHG

## 2018-06-15 DIAGNOSIS — R73.03 PREDIABETES: ICD-10-CM

## 2018-06-15 DIAGNOSIS — R10.11 RUQ PAIN: Primary | ICD-10-CM

## 2018-06-15 DIAGNOSIS — R10.0 ACUTE ABDOMEN: Primary | ICD-10-CM

## 2018-06-15 DIAGNOSIS — R10.0 ACUTE ABDOMEN: ICD-10-CM

## 2018-06-15 PROCEDURE — 99212 OFFICE O/P EST SF 10 MIN: CPT | Performed by: FAMILY MEDICINE

## 2018-06-15 PROCEDURE — 83036 HEMOGLOBIN GLYCOSYLATED A1C: CPT

## 2018-06-15 PROCEDURE — 99214 OFFICE O/P EST MOD 30 MIN: CPT | Performed by: FAMILY MEDICINE

## 2018-06-15 PROCEDURE — 74177 CT ABD & PELVIS W/CONTRAST: CPT | Performed by: FAMILY MEDICINE

## 2018-06-15 PROCEDURE — 82947 ASSAY GLUCOSE BLOOD QUANT: CPT

## 2018-06-15 PROCEDURE — 87086 URINE CULTURE/COLONY COUNT: CPT

## 2018-06-15 PROCEDURE — 80076 HEPATIC FUNCTION PANEL: CPT

## 2018-06-15 PROCEDURE — 85025 COMPLETE CBC W/AUTO DIFF WBC: CPT

## 2018-06-15 PROCEDURE — 83690 ASSAY OF LIPASE: CPT

## 2018-06-15 PROCEDURE — 36415 COLL VENOUS BLD VENIPUNCTURE: CPT

## 2018-06-15 PROCEDURE — 82565 ASSAY OF CREATININE: CPT

## 2018-06-15 NOTE — TELEPHONE ENCOUNTER
PLEASE NOTIFY SON OF PATIENT NO ACUTE FINDINGS ON BLOOD OR CT. PLEASE SCHEDULE GB US ORDER ENTERED. ALSO PATIENT TO FU WITH GI REFERRAL ENTERED.

## 2018-06-15 NOTE — PROGRESS NOTES
Blood pressure (!) 171/81, pulse 76, height 5' 3\" (1.6 m), weight 148 lb (67.1 kg). Patient presents today complaining of 5 days of abdominal pain. No vomiting. He has not eaten or had a bowel movement today.   He ate had a bowel movement yesterday no

## 2018-06-15 NOTE — TELEPHONE ENCOUNTER
Parker from lab called with stat results. UA, glucose and lipase abnormal  Please review results and advise.

## 2018-06-18 ENCOUNTER — TELEPHONE (OUTPATIENT)
Dept: FAMILY MEDICINE CLINIC | Facility: CLINIC | Age: 83
End: 2018-06-18

## 2018-06-18 NOTE — TELEPHONE ENCOUNTER
LMTCB-ext [id number] today only      Notes recorded by Gordo Hauser DO on 6/18/2018 at 1:05 PM CDT  No cause for abdominal pain seen on workup patient to fu with Shalom Allen as referred.

## 2018-07-02 RX ORDER — AMITRIPTYLINE HYDROCHLORIDE 10 MG/1
TABLET, FILM COATED ORAL
Qty: 180 TABLET | Refills: 0 | Status: SHIPPED | OUTPATIENT
Start: 2018-07-02 | End: 2018-10-09

## 2018-07-02 RX ORDER — AMITRIPTYLINE HYDROCHLORIDE 10 MG/1
TABLET, FILM COATED ORAL
Qty: 30 TABLET | Refills: 1 | OUTPATIENT
Start: 2018-07-02

## 2018-07-03 NOTE — PROGRESS NOTES
[facility] Follow-up Visit    Amber associated abdominal pain or recent changes in bowel habits, hematochezia, or melena. Possible family history of colon cancer though neither the patient or his son are able to confirm this.       Pertinent Surgical Hx:  No abdominal surgery  - See additi Rfl:  2   AMITRIPTYLINE HCL 10 MG Oral Tab take 1-2 tablets by mouth at bedtime Disp: 180 tablet Rfl: 0   Pantoprazole Sodium 40 MG Oral Tab EC Take 1 tablet (40 mg total) by mouth every morning before breakfast. Disp: 90 tablet Rfl: 11   Clopidogrel Bisulf guarding, non-distended, no abnormal bowel sounds noted, no masses are palpated  : no CVA tenderness  Skin: dry, warm, no jaundice  Ext: no cyanosis, clubbing or edema is evident.    Neuro: Alert and oriented x4, and patient is having movements of all 4 e

## 2018-07-16 ENCOUNTER — OFFICE VISIT (OUTPATIENT)
Dept: GASTROENTEROLOGY | Facility: CLINIC | Age: 83
End: 2018-07-16

## 2018-07-16 VITALS
BODY MASS INDEX: 26.91 KG/M2 | HEART RATE: 61 BPM | DIASTOLIC BLOOD PRESSURE: 71 MMHG | WEIGHT: 150 LBS | HEIGHT: 62.5 IN | SYSTOLIC BLOOD PRESSURE: 110 MMHG

## 2018-07-16 DIAGNOSIS — K59.00 CONSTIPATION, UNSPECIFIED CONSTIPATION TYPE: ICD-10-CM

## 2018-07-16 DIAGNOSIS — K44.9 HIATAL HERNIA WITH GERD: Primary | ICD-10-CM

## 2018-07-16 DIAGNOSIS — K21.9 HIATAL HERNIA WITH GERD: Primary | ICD-10-CM

## 2018-07-16 PROCEDURE — 99213 OFFICE O/P EST LOW 20 MIN: CPT | Performed by: NURSE PRACTITIONER

## 2018-07-16 PROCEDURE — 99212 OFFICE O/P EST SF 10 MIN: CPT | Performed by: NURSE PRACTITIONER

## 2018-07-16 RX ORDER — ENALAPRIL MALEATE 10 MG/1
10 TABLET ORAL DAILY
Refills: 2 | COMMUNITY
Start: 2018-07-02 | End: 2018-08-04

## 2018-07-16 NOTE — PATIENT INSTRUCTIONS
1. Increase water/dietary fiber (fruits/veggies) daily  2. MiraLAX daily for constipation  3. Restart Pantoprazole every morning for heartburn  4.  See additional reflux suggestions below        Avoid Heartburn Triggers  - Laying down after meals (sit uprig

## 2018-07-30 NOTE — TELEPHONE ENCOUNTER
Dr [facility] refill on 5/18/18 enalapril maleate 20 mg daily 90+3 refills and on 7/2/18 MORGAN BATISTA documented pt on enalapril maleate 10 mg daily. LMTCB to clarify with pt what dose and frequency enalapril maleate?       Hypertensive Medications  Protocol C

## 2018-08-01 NOTE — TELEPHONE ENCOUNTER
Spoke with son Shelly Gilmore and relayed messages below--he states patient is out of town  For one week--unsure what dose patient is taking. He will try to contact patient and call back.     [facility]--please advise on which dose of Enalapril patient should be taking--

## 2018-08-02 RX ORDER — ENALAPRIL MALEATE 10 MG/1
TABLET ORAL
Qty: 180 TABLET | Refills: 0 | OUTPATIENT
Start: 2018-08-02

## 2018-08-02 NOTE — TELEPHONE ENCOUNTER
Pt's son called back, states pt is taking one 10mg and one 20mg tab of enalapril daily. Is this correct Dr [facility]?

## 2018-08-03 NOTE — TELEPHONE ENCOUNTER
Yes that is fine. Please refill both and sig should be enalapril take 10mg tab along with a 20mg tab daily #90 with 3 refills. Take 20mg enalapril along with a 10mg tab daily #90 with 3 refills.

## 2018-08-04 RX ORDER — ENALAPRIL MALEATE 20 MG/1
20 TABLET ORAL DAILY
Qty: 90 TABLET | Refills: 3 | Status: SHIPPED | OUTPATIENT
Start: 2018-08-04 | End: 2018-11-09

## 2018-08-04 RX ORDER — ENALAPRIL MALEATE 10 MG/1
10 TABLET ORAL DAILY
Qty: 90 TABLET | Refills: 3 | Status: SHIPPED | OUTPATIENT
Start: 2018-08-04 | End: 2018-11-09

## 2018-08-21 ENCOUNTER — NURSE TRIAGE (OUTPATIENT)
Dept: OTHER | Age: 83
End: 2018-08-21

## 2018-08-21 NOTE — TELEPHONE ENCOUNTER
Action Requested: Summary for Provider     []  Critical Lab, Recommendations Needed  [] Need Additional Advice  []   FYI    []   Need Orders  [] Need Medications Sent to Pharmacy  []  Other     SUMMARY: appt made for WED with [facility].   Son called for ap

## 2018-08-22 ENCOUNTER — OFFICE VISIT (OUTPATIENT)
Dept: FAMILY MEDICINE CLINIC | Facility: CLINIC | Age: 83
End: 2018-08-22
Payer: MEDICAID

## 2018-08-22 VITALS
BODY MASS INDEX: 26.8 KG/M2 | HEART RATE: 85 BPM | WEIGHT: 149.38 LBS | HEIGHT: 62.5 IN | SYSTOLIC BLOOD PRESSURE: 127 MMHG | DIASTOLIC BLOOD PRESSURE: 74 MMHG

## 2018-08-22 DIAGNOSIS — H10.9 BACTERIAL CONJUNCTIVITIS: Primary | ICD-10-CM

## 2018-08-22 PROCEDURE — 99212 OFFICE O/P EST SF 10 MIN: CPT | Performed by: FAMILY MEDICINE

## 2018-08-22 PROCEDURE — 99213 OFFICE O/P EST LOW 20 MIN: CPT | Performed by: FAMILY MEDICINE

## 2018-08-22 RX ORDER — TOBRAMYCIN 3 MG/ML
2 SOLUTION/ DROPS OPHTHALMIC
Qty: 1 BOTTLE | Refills: 0 | Status: SHIPPED | OUTPATIENT
Start: 2018-08-22 | End: 2018-11-09

## 2018-08-22 NOTE — PROGRESS NOTES
Blood pressure 127/74, pulse 85, height 5' 2.5\" (1.588 m), weight 149 lb 6 oz (67.8 kg). Patient presents today complaining of bilateral eye redness and discharge from 2 days no contact lenses some coughing some sneezing.        Objective eyes PERRLA er

## 2018-08-27 NOTE — TELEPHONE ENCOUNTER
Pharmacy requesting rx for metoprolol succinate er 100mg  Qty 90  Take 1 tablet (100 mg) and take with 50mg tablet for a total of 150mg.  Per pharmacy pt does not have rx for 50mg

## 2018-08-31 RX ORDER — METOPROLOL SUCCINATE 50 MG/1
50 TABLET, EXTENDED RELEASE ORAL DAILY
Qty: 90 TABLET | Refills: 0 | Status: SHIPPED | OUTPATIENT
Start: 2018-08-31 | End: 2018-11-09

## 2018-08-31 RX ORDER — METOPROLOL SUCCINATE 100 MG/1
100 TABLET, EXTENDED RELEASE ORAL DAILY
Qty: 90 TABLET | Refills: 0 | Status: SHIPPED | OUTPATIENT
Start: 2018-08-31 | End: 2018-12-08

## 2018-08-31 NOTE — TELEPHONE ENCOUNTER
Hypertensive Medications  Protocol Criteria:  · Appointment scheduled in the past 6 months or in the next 3 months  · BMP or CMP in the past 12 months  · Creatinine result < 2  Recent Outpatient Visits            1 week ago Bacterial conjunctivitis    [facility]

## 2018-10-09 ENCOUNTER — OFFICE VISIT (OUTPATIENT)
Dept: FAMILY MEDICINE CLINIC | Facility: CLINIC | Age: 83
End: 2018-10-09
Payer: MEDICAID

## 2018-10-09 VITALS
WEIGHT: 150 LBS | TEMPERATURE: 98 F | HEART RATE: 71 BPM | BODY MASS INDEX: 27 KG/M2 | DIASTOLIC BLOOD PRESSURE: 74 MMHG | SYSTOLIC BLOOD PRESSURE: 146 MMHG

## 2018-10-09 DIAGNOSIS — I73.9 CLAUDICATION OF BOTH LOWER EXTREMITIES (HCC): ICD-10-CM

## 2018-10-09 DIAGNOSIS — G47.30 SEVERE SLEEP APNEA: ICD-10-CM

## 2018-10-09 DIAGNOSIS — I34.0 MODERATE MITRAL REGURGITATION: ICD-10-CM

## 2018-10-09 DIAGNOSIS — E11.42 TYPE 2 DIABETES MELLITUS WITH DIABETIC POLYNEUROPATHY, WITHOUT LONG-TERM CURRENT USE OF INSULIN (HCC): ICD-10-CM

## 2018-10-09 DIAGNOSIS — I73.9 PVD (PERIPHERAL VASCULAR DISEASE) (HCC): ICD-10-CM

## 2018-10-09 DIAGNOSIS — E11.42 DIABETIC POLYNEUROPATHY ASSOCIATED WITH TYPE 2 DIABETES MELLITUS (HCC): Primary | ICD-10-CM

## 2018-10-09 DIAGNOSIS — I42.9 CARDIOMYOPATHY, UNSPECIFIED TYPE (HCC): ICD-10-CM

## 2018-10-09 DIAGNOSIS — I10 ESSENTIAL HYPERTENSION WITH GOAL BLOOD PRESSURE LESS THAN 130/80: ICD-10-CM

## 2018-10-09 DIAGNOSIS — E53.8 VITAMIN B 12 DEFICIENCY: ICD-10-CM
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
## 2018-10-09 PROCEDURE — 99212 OFFICE O/P EST SF 10 MIN: CPT | Performed by: FAMILY MEDICINE

## 2018-10-09 PROCEDURE — 90471 IMMUNIZATION ADMIN: CPT | Performed by: FAMILY MEDICINE

## 2018-10-09 PROCEDURE — 90686 IIV4 VACC NO PRSV 0.5 ML IM: CPT | Performed by: FAMILY MEDICINE

## 2018-10-09 PROCEDURE — 99215 OFFICE O/P EST HI 40 MIN: CPT | Performed by: FAMILY MEDICINE

## 2018-10-09 RX ORDER — AMITRIPTYLINE HYDROCHLORIDE 10 MG/1
TABLET, FILM COATED ORAL NIGHTLY
Qty: 180 TABLET | Refills: 0 | Status: SHIPPED | OUTPATIENT
Start: 2018-10-09 | End: 2018-12-30

## 2018-10-09 NOTE — PROGRESS NOTES
He doesn't like to walk  Has had some swelling on the feet. The feet has sensation. Has had falls  Has abrasion left knee  Had fall    Sob with ambulation  Patient's past medical surgical family social history was reviewed.     Review of Systems  Allerg LOWER EXT (W+WO IV) EM (CPT=73706)        Unable to walk on treadm mill    (I10) Essential hypertension with goal blood pressure less than 130/80  Plan: stable    (I42.9) Cardiomyopathy, unspecified type ([address]  Plan: CARD ECHO 2D DOPPLER (CPT=93306), CARDI

## 2018-10-18 ENCOUNTER — HOSPITAL ENCOUNTER (OUTPATIENT)
Dept: CT IMAGING | Facility: HOSPITAL | Age: 83
Discharge: HOME | End: 2018-10-18
Attending: FAMILY MEDICINE
Payer: MEDICAID

## 2018-10-18 ENCOUNTER — TELEPHONE (OUTPATIENT)
Dept: FAMILY MEDICINE CLINIC | Facility: CLINIC | Age: 83
End: 2018-10-18

## 2018-10-18 ENCOUNTER — HOSPITAL ENCOUNTER (OUTPATIENT)
Dept: CV DIAGNOSTICS | Facility: HOSPITAL | Age: 83
Discharge: HOME | End: 2018-10-18
Attending: FAMILY MEDICINE
Payer: MEDICAID

## 2018-10-18 DIAGNOSIS — I73.9 CLAUDICATION OF BOTH LOWER EXTREMITIES (HCC): ICD-10-CM

## 2018-10-18 DIAGNOSIS — I42.9 CARDIOMYOPATHY, UNSPECIFIED TYPE (HCC): ICD-10-CM

## 2018-10-18 DIAGNOSIS — I70.229 ATHEROSCLEROTIC PERIPHERAL VASCULAR DISEASE WITH REST PAIN (HCC): Primary | ICD-10-CM

## 2018-10-18 DIAGNOSIS — I73.9 PVD (PERIPHERAL VASCULAR DISEASE) (HCC): ICD-10-CM

## 2018-10-18 PROCEDURE — 82565 ASSAY OF CREATININE: CPT

## 2018-10-18 PROCEDURE — 93306 TTE W/DOPPLER COMPLETE: CPT | Performed by: FAMILY MEDICINE

## 2018-10-18 PROCEDURE — 73706 CT ANGIO LWR EXTR W/O&W/DYE: CPT | Performed by: FAMILY MEDICINE

## 2018-10-18 NOTE — TELEPHONE ENCOUNTER
Dr Annalise Watson, Pt's ins is requesting additional clinical information (all previous clinicals faxed). The letter from ins is being faxed to your office.

## 2018-10-20 NOTE — TELEPHONE ENCOUNTER
Informed daughter referral placed for Dr. Liz Chavarria. Information below given. Pt has appt with Dr. Severa Res in November. Routed to [facility] clinical Saff to see if a cath can be set up.  Please advise

## 2018-10-22 ENCOUNTER — TELEPHONE (OUTPATIENT)
Dept: CARDIOLOGY CLINIC | Facility: CLINIC | Age: 83
End: 2018-10-22

## 2018-10-22 NOTE — TELEPHONE ENCOUNTER
Pt daughter/Monica calling for results from Echo, pt has appt celi 11/27/18, but would like to know if results can be provided sooner or if pt can f/up with AP[facility] for sooner appt. Pls call at:[phone],thanks.

## 2018-10-24 NOTE — TELEPHONE ENCOUNTER
Cardiology has not met with this patient yet. Routing question for Echo results to Dr. Robles Arnett. Also please note, cardiology does not schedule Dr. Maday Alvarado angiograms. Dr. Maday Alvarado nurse is Angel Antonio [phone]. S/w dtr Kinsey Lundberg, Attempting earlier follow up for 11/6 @ 10:30.  Will confirm w/dtr when confirmed by MB

## 2018-10-24 NOTE — TELEPHONE ENCOUNTER
Dr Merissa Ruiz, Pt's ins has denied the CTA (CPT 15270). The denial letter from the ins is being faxed to your office.

## 2018-10-25 NOTE — TELEPHONE ENCOUNTER
Pt already had the study. (which was positive for cholesterol artery disease)  Make sure he has appt with Rosa Whitley as he was told.

## 2018-10-29 NOTE — TELEPHONE ENCOUNTER
[facility], patient was seen by Dr. Fany Rockwell on 10/25/2018.

## 2018-11-03 ENCOUNTER — LAB ENCOUNTER (OUTPATIENT)
Dept: LAB | Age: 83
End: 2018-11-03
Attending: FAMILY MEDICINE
Payer: MEDICAID

## 2018-11-03 DIAGNOSIS — E11.42 TYPE 2 DIABETES MELLITUS WITH DIABETIC POLYNEUROPATHY, WITHOUT LONG-TERM CURRENT USE OF INSULIN (HCC): ICD-10-CM

## 2018-11-03 DIAGNOSIS — I42.9 CARDIOMYOPATHY, UNSPECIFIED TYPE (HCC): ICD-10-CM

## 2018-11-03 DIAGNOSIS — E53.8 VITAMIN B 12 DEFICIENCY: ICD-10-CM

## 2018-11-03 PROCEDURE — 36415 COLL VENOUS BLD VENIPUNCTURE: CPT

## 2018-11-03 PROCEDURE — 85025 COMPLETE CBC W/AUTO DIFF WBC: CPT

## 2018-11-03 PROCEDURE — 82043 UR ALBUMIN QUANTITATIVE: CPT

## 2018-11-03 PROCEDURE — 82607 VITAMIN B-12: CPT

## 2018-11-03 PROCEDURE — 83036 HEMOGLOBIN GLYCOSYLATED A1C: CPT

## 2018-11-03 PROCEDURE — 80053 COMPREHEN METABOLIC PANEL: CPT

## 2018-11-03 PROCEDURE — 82570 ASSAY OF URINE CREATININE: CPT

## 2018-11-03 PROCEDURE — 80061 LIPID PANEL: CPT

## 2018-11-06 ENCOUNTER — TELEPHONE (OUTPATIENT)
Dept: INTERNAL MEDICINE CLINIC | Facility: CLINIC | Age: 83
End: 2018-11-06

## 2018-11-06 ENCOUNTER — OFFICE VISIT (OUTPATIENT)
Dept: CARDIOLOGY CLINIC | Facility: CLINIC | Age: 83
End: 2018-11-06
Payer: MEDICAID

## 2018-11-06 VITALS
WEIGHT: 150 LBS | BODY MASS INDEX: 27 KG/M2 | SYSTOLIC BLOOD PRESSURE: 110 MMHG | HEART RATE: 60 BPM | RESPIRATION RATE: 16 BRPM | DIASTOLIC BLOOD PRESSURE: 62 MMHG

## 2018-11-06 DIAGNOSIS — E78.89 LIPIDS ABNORMAL: Primary | ICD-10-CM

## 2018-11-06 DIAGNOSIS — I25.10 ATHEROSCLEROSIS OF NATIVE CORONARY ARTERY OF NATIVE HEART WITHOUT ANGINA PECTORIS: ICD-10-CM

## 2018-11-06 DIAGNOSIS — I10 ESSENTIAL HYPERTENSION: ICD-10-CM

## 2018-11-06 DIAGNOSIS — E78.2 MIXED HYPERLIPIDEMIA: Primary | ICD-10-CM

## 2018-11-06 PROCEDURE — 99212 OFFICE O/P EST SF 10 MIN: CPT | Performed by: INTERNAL MEDICINE

## 2018-11-06 PROCEDURE — 99214 OFFICE O/P EST MOD 30 MIN: CPT | Performed by: INTERNAL MEDICINE

## 2018-11-06 RX ORDER — ATORVASTATIN CALCIUM 40 MG/1
40 TABLET, FILM COATED ORAL NIGHTLY
Qty: 30 TABLET | Refills: 5 | Status: SHIPPED | OUTPATIENT
Start: 2018-11-06 | End: 2019-06-17

## 2018-11-06 NOTE — TELEPHONE ENCOUNTER
Notes recorded by Patricia Motley DO on 11/6/2018 at 8:16 AM CST  Repeat lipids CMP 3 months diagnosis hyperlipidemia    ----- Message from Lisa Fields DO sent at 11/6/2018  8:16 AM CST -----  Have the patient  restart his cholesterol medication

## 2018-11-06 NOTE — TELEPHONE ENCOUNTER
Forward results to RN in triage to contact patient with abnormal results and instructions on medication

## 2018-11-06 NOTE — PROGRESS NOTES
Hannah Walker is a 80year old male. Patient presents with:  CAD    HPI:   This is a pleasant 44-year-old gentleman with coronary disease prior MI stent elevated cholesterol hypertension who presents for follow-up.   History is obtained with the assistan tablet (20 mg total) by mouth daily. Takes along with Enalapril 10 mg Disp: 90 tablet Rfl: 3   Clopidogrel Bisulfate 75 MG Oral Tab Take 1 tablet (75 mg total) by mouth once daily.  Disp: 90 tablet Rfl: 11      Past Medical History:   Diagnosis Date   • Aor Relevant Orders    AST (SGOT)    ALT (SGPT)    LIPID PANEL        In conclusion this is a pleasant 80year-old with coronary disease prior MI and risks who is overall doing okay.   His LV function is minimally decreased compared to his last echo with minima

## 2018-11-06 NOTE — TELEPHONE ENCOUNTER
Dr Gant Pump, please note. Did you want a CMP ordered in addition to Dr Raymond Salazar ordered ALT and AST? Also the A1c that \"in process\" is now running (issue on Saturday, backed up, now running).     Advised patient's son (on HIPAA) on Dr. Yesi Bosch infor

## 2018-11-09 ENCOUNTER — OFFICE VISIT (OUTPATIENT)
Dept: FAMILY MEDICINE CLINIC | Facility: CLINIC | Age: 83
End: 2018-11-09
Payer: MEDICAID

## 2018-11-09 VITALS
DIASTOLIC BLOOD PRESSURE: 64 MMHG | TEMPERATURE: 98 F | SYSTOLIC BLOOD PRESSURE: 127 MMHG | WEIGHT: 150 LBS | HEART RATE: 72 BPM | BODY MASS INDEX: 27 KG/M2

## 2018-11-09 DIAGNOSIS — I34.0 MODERATE MITRAL REGURGITATION: ICD-10-CM

## 2018-11-09 DIAGNOSIS — I70.229 ATHEROSCLEROTIC PERIPHERAL VASCULAR DISEASE WITH REST PAIN (HCC): Primary | ICD-10-CM

## 2018-11-09 DIAGNOSIS — E11.42 DIABETIC POLYNEUROPATHY ASSOCIATED WITH TYPE 2 DIABETES MELLITUS (HCC): ICD-10-CM

## 2018-11-09 DIAGNOSIS — I42.9 CARDIOMYOPATHY, UNSPECIFIED TYPE (HCC): ICD-10-CM

## 2018-11-09 DIAGNOSIS — I10 ESSENTIAL HYPERTENSION WITH GOAL BLOOD PRESSURE LESS THAN 130/80: ICD-10-CM

## 2018-11-09 DIAGNOSIS — I73.9 PVD (PERIPHERAL VASCULAR DISEASE) (HCC): ICD-10-CM

## 2018-11-09 DIAGNOSIS — G47.30 SEVERE SLEEP APNEA: ICD-10-CM

## 2018-11-09 PROCEDURE — 99212 OFFICE O/P EST SF 10 MIN: CPT | Performed by: FAMILY MEDICINE

## 2018-11-09 PROCEDURE — 99215 OFFICE O/P EST HI 40 MIN: CPT | Performed by: FAMILY MEDICINE

## 2018-11-09 RX ORDER — AMLODIPINE BESYLATE 2.5 MG/1
2.5 TABLET ORAL DAILY
Qty: 90 TABLET | Refills: 3 | Status: SHIPPED | OUTPATIENT
Start: 2018-11-09 | End: 2019-06-17

## 2018-11-09 RX ORDER — ENALAPRIL MALEATE 10 MG/1
10 TABLET ORAL DAILY
Qty: 90 TABLET | Refills: 3 | Status: SHIPPED | OUTPATIENT
Start: 2018-11-09 | End: 2019-06-17

## 2018-11-09 RX ORDER — CLOPIDOGREL BISULFATE 75 MG/1
75 TABLET ORAL
Qty: 90 TABLET | Refills: 11 | Status: SHIPPED | OUTPATIENT
Start: 2018-11-09 | End: 2019-06-17

## 2018-11-09 NOTE — PROGRESS NOTES
Reviewed with patient wife and son his o/v with specialists. Pt with pvd severe on the feet. Has some numbness   Feet are cold    No cp no sob  No problems with medication  On 10 mg of ace.       Patient's past medical surgical family social history was r Future    3. PVD (peripheral vascular disease) (HCC)  As avoe  - AmLODIPine Besylate 2.5 MG Oral Tab; Take 1 tablet (2.5 mg total) by mouth daily. Dispense: 90 tablet; Refill: 3    4.  Cardiomyopathy, unspecified type ([facility]  F/u cards 6 mo  - Enalapril Mal

## 2018-11-12 ENCOUNTER — OFFICE VISIT (OUTPATIENT)
Dept: PHYSICAL THERAPY | Age: 83
End: 2018-11-12
Attending: FAMILY MEDICINE
Payer: MEDICAID
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
## 2018-11-12 DIAGNOSIS — I73.9 PVD (PERIPHERAL VASCULAR DISEASE) (HCC): ICD-10-CM

## 2018-11-12 PROCEDURE — 97161 PT EVAL LOW COMPLEX 20 MIN: CPT

## 2018-11-12 PROCEDURE — 97110 THERAPEUTIC EXERCISES: CPT

## 2018-11-12 NOTE — PROGRESS NOTES
P.T. EVALUATION:   Referring Physician: Dr. Chance Velázquez  Diagnosis: PVD (peripheral vascular disease) ([facility] (I73.9)  Fall, initial encounter (M19.[facility])     Date of Onset: 2016  Date of Service: 11/12/2018     PATIENT SUMMARY   Thalia Cisneros is a 80 year 4+/5  Ankle df: R 4+/5, L 5/5  Ankle pf: R 4/5, L 4+/5    Special tests:   TUG test: (no AD utilized)  Trial 1: 12.33 sec  Trial 2: 13.03 sec  Trial 3: 11.65 sec  Average score: 12.34 sec  (11-12 seconds norms for age group; < 10 seconds - normal for gener via fax as soon as possible to [phone].  If you have any questions, please contact me at Dept: [phone]    Sincerely,  Electronically signed by therapist: Johnathan Rodriguez, PT    [de-identified] certification required: Yes  I certify the need for these se

## 2018-11-19 ENCOUNTER — OFFICE VISIT (OUTPATIENT)
Dept: PHYSICAL THERAPY | Age: 83
End: 2018-11-19
Attending: FAMILY MEDICINE
Payer: MEDICAID

## 2018-11-19 PROCEDURE — 97110 THERAPEUTIC EXERCISES: CPT

## 2018-11-19 NOTE — PROGRESS NOTES
Diagnosis: PVD (peripheral vascular disease) ([facility] (I73.9)  Fall, initial encounter (T82.HXSM)          Next MD visit: none scheduled  Fall Risk: standard         Precautions: n/a          Medication Changes since last visit?: No    Subjective: Pt reports

## 2018-11-21 ENCOUNTER — OFFICE VISIT (OUTPATIENT)
Dept: PHYSICAL THERAPY | Age: 83
End: 2018-11-21
Attending: FAMILY MEDICINE
Payer: MEDICAID

## 2018-11-21 PROCEDURE — 97110 THERAPEUTIC EXERCISES: CPT

## 2018-11-21 NOTE — PROGRESS NOTES
Diagnosis: PVD (peripheral vascular disease) ([facility] [address] (I73.9)  Fall, initial encounter (L72.NKZW)          Next MD visit: none scheduled  Fall Risk: standard         Precautions: n/a          Medication Changes since last visit?: No  Subjective: Pt reports on

## 2018-11-26 ENCOUNTER — OFFICE VISIT (OUTPATIENT)
Dept: PHYSICAL THERAPY | Age: 83
End: 2018-11-26
Attending: FAMILY MEDICINE
Payer: MEDICAID

## 2018-11-26 PROCEDURE — 97110 THERAPEUTIC EXERCISES: CPT

## 2018-11-26 NOTE — PROGRESS NOTES
Diagnosis: PVD (peripheral vascular disease) ([facility] (I73.9)  Fall, initial encounter (A70.NEDRAEZ)          Next MD visit: none scheduled  Fall Risk: standard         Precautions: n/a          Medication Changes since last visit?: No  Subjective: Pt reports no

## 2018-11-28 ENCOUNTER — OFFICE VISIT (OUTPATIENT)
Dept: PHYSICAL THERAPY | Age: 83
End: 2018-11-28
Attending: FAMILY MEDICINE
Payer: MEDICAID

## 2018-11-28 PROCEDURE — 97110 THERAPEUTIC EXERCISES: CPT

## 2018-11-28 NOTE — PROGRESS NOTES
Diagnosis: PVD (peripheral vascular disease) ([address] (I73.9)  Fall, initial encounter (K03.BEWR)          Next MD visit: none scheduled  Fall Risk: standard         Precautions: n/a          Medication Changes since last visit?: No  Subjective: Pt reports no

## 2018-12-03 ENCOUNTER — OFFICE VISIT (OUTPATIENT)
Dept: PHYSICAL THERAPY | Age: 83
End: 2018-12-03
Attending: FAMILY MEDICINE
Payer: MEDICAID

## 2018-12-03 PROCEDURE — 97110 THERAPEUTIC EXERCISES: CPT

## 2018-12-03 NOTE — PROGRESS NOTES
Diagnosis: PVD (peripheral vascular disease) ([facility] [address] (I73.9)  Fall, initial encounter (L57.KEXV)          Next MD visit: none scheduled  Fall Risk: standard         Precautions: n/a          Medication Changes since last visit?: No  Subjective: Pt reports he

## 2018-12-05 ENCOUNTER — OFFICE VISIT (OUTPATIENT)
Dept: PHYSICAL THERAPY | Age: 83
End: 2018-12-05
Attending: FAMILY MEDICINE
Payer: MEDICAID

## 2018-12-05 PROCEDURE — 97530 THERAPEUTIC ACTIVITIES: CPT

## 2018-12-05 PROCEDURE — 97110 THERAPEUTIC EXERCISES: CPT

## 2018-12-05 NOTE — PROGRESS NOTES
Diagnosis: PVD (peripheral vascular disease) ([facility] [address] (I73.9)  Fall, initial encounter (P12.LYSC)          Next MD visit: none scheduled  Fall Risk: standard         Precautions: n/a          Medication Changes since last visit?: No  Subjective: Pt reports he

## 2018-12-08 RX ORDER — METOPROLOL SUCCINATE 100 MG/1
TABLET, EXTENDED RELEASE ORAL
Qty: 30 TABLET | Refills: 0 | Status: SHIPPED | OUTPATIENT
Start: 2018-12-08 | End: 2019-01-25

## 2018-12-08 NOTE — TELEPHONE ENCOUNTER
Hypertensive Medications  Protocol Criteria:  · Appointment scheduled in the past 6 months or in the next 3 months  · BMP or CMP in the past 12 months  · Creatinine result < 2  Recent Outpatient Visits            3 days ago     MICHA Tony in

## 2018-12-10 ENCOUNTER — OFFICE VISIT (OUTPATIENT)
Dept: PHYSICAL THERAPY | Age: 83
End: 2018-12-10
Attending: FAMILY MEDICINE
Payer: MEDICAID

## 2018-12-10 PROCEDURE — 97110 THERAPEUTIC EXERCISES: CPT

## 2018-12-10 PROCEDURE — 97530 THERAPEUTIC ACTIVITIES: CPT

## 2018-12-10 NOTE — PROGRESS NOTES
Physical Therapy Discharge Summary    Pt has attended 8 visits in Physical Therapy.      Diagnosis: PVD [facility]peripheral vascular disease) ([facility] (I73.9)  Fall, initial encounter (E81.AXGN)          Next MD visit: none scheduled  Fall Risk: standard         Prec 42/56 (<42 is predictive of falls per ole in older adults)   Modalities                    Charges: Ex 2 TA 1   Total Timed Treatment: 45 min  Total Treatment Time: 45 min    Goals:   1- Pt will be I with maintenance and progression of HEP - IN [place]

## 2018-12-30 DIAGNOSIS — E11.42 DIABETIC POLYNEUROPATHY ASSOCIATED WITH TYPE 2 DIABETES MELLITUS (HCC): ICD-10-CM

## 2018-12-31 RX ORDER — AMITRIPTYLINE HYDROCHLORIDE 10 MG/1
TABLET, FILM COATED ORAL NIGHTLY
Qty: 60 TABLET | Refills: 0 | Status: SHIPPED | OUTPATIENT
Start: 2018-12-31 | End: 2019-01-25

## 2019-01-14 ENCOUNTER — TELEPHONE (OUTPATIENT)
Dept: INTERNAL MEDICINE CLINIC | Facility: CLINIC | Age: 84
End: 2019-01-14

## 2019-01-14 NOTE — TELEPHONE ENCOUNTER
Dr. Mckenzie barrera (gastro/liver specialist) called in stating that Pt has appt today at 3:45pm. Dr. Nicole Rodriguez wasn't really sure why Pt was coming to see him specifically.  He does have access to Epic and can review the notes, but wanted to touch base with

## 2019-01-18 ENCOUNTER — TELEPHONE (OUTPATIENT)
Dept: FAMILY MEDICINE CLINIC | Facility: CLINIC | Age: 84
End: 2019-01-18

## 2019-01-18 NOTE — TELEPHONE ENCOUNTER
Pt's son called in requesting approval for pt and his wife to come in together on 1/25. Aia 16 is it ok if I use the 9:30 am SDS slot for the pt's to be seen right after each other?     Please reply to pool: REKHA Mccray

## 2019-01-25 ENCOUNTER — OFFICE VISIT (OUTPATIENT)
Dept: FAMILY MEDICINE CLINIC | Facility: CLINIC | Age: 84
End: 2019-01-25
Payer: MEDICAID

## 2019-01-25 VITALS
DIASTOLIC BLOOD PRESSURE: 70 MMHG | SYSTOLIC BLOOD PRESSURE: 139 MMHG | TEMPERATURE: 98 F | BODY MASS INDEX: 27 KG/M2 | WEIGHT: 148 LBS | HEART RATE: 106 BPM

## 2019-01-25 DIAGNOSIS — I34.0 MODERATE MITRAL REGURGITATION: ICD-10-CM

## 2019-01-25 DIAGNOSIS — I10 ESSENTIAL HYPERTENSION: Chronic | ICD-10-CM

## 2019-01-25 DIAGNOSIS — E53.8 VITAMIN B 12 DEFICIENCY: ICD-10-CM

## 2019-01-25 DIAGNOSIS — E11.42 TYPE 2 DIABETES MELLITUS WITH DIABETIC POLYNEUROPATHY, WITHOUT LONG-TERM CURRENT USE OF INSULIN (HCC): ICD-10-CM

## 2019-01-25 DIAGNOSIS — E11.42 DIABETIC POLYNEUROPATHY ASSOCIATED WITH TYPE 2 DIABETES MELLITUS (HCC): ICD-10-CM

## 2019-01-25 DIAGNOSIS — I73.9 PVD (PERIPHERAL VASCULAR DISEASE) (HCC): ICD-10-CM

## 2019-01-25 DIAGNOSIS — I25.10 ATHEROSCLEROSIS OF NATIVE CORONARY ARTERY OF NATIVE HEART WITHOUT ANGINA PECTORIS: Primary | Chronic | ICD-10-CM

## 2019-01-25 DIAGNOSIS — M48.061 SPINAL STENOSIS OF LUMBAR REGION WITHOUT NEUROGENIC CLAUDICATION: Chronic | ICD-10-CM

## 2019-01-25 PROCEDURE — 99215 OFFICE O/P EST HI 40 MIN: CPT | Performed by: FAMILY MEDICINE

## 2019-01-25 PROCEDURE — 99212 OFFICE O/P EST SF 10 MIN: CPT | Performed by: FAMILY MEDICINE

## 2019-01-25 RX ORDER — AMITRIPTYLINE HYDROCHLORIDE 10 MG/1
TABLET, FILM COATED ORAL NIGHTLY
Qty: 60 TABLET | Refills: 0 | Status: SHIPPED | OUTPATIENT
Start: 2019-01-25 | End: 2019-04-25

## 2019-01-25 RX ORDER — METOPROLOL SUCCINATE 100 MG/1
100 TABLET, EXTENDED RELEASE ORAL DAILY
Qty: 30 TABLET | Refills: 0 | Status: SHIPPED | OUTPATIENT
Start: 2019-01-25 | End: 2019-06-17

## 2019-01-25 NOTE — PROGRESS NOTES
Has intermittant swelling in feet. Not bad now    reviewed cta with son wife and patient. Showed intemittnat bloackedin calf  Discussed. Has dry skin on legs  No leg pain with ambulation. Did his p.t. And walks better no stumbling and falling.   Safet tablet; Refill: 0    2. Atherosclerosis of native coronary artery of native heart without angina pectoris  stable  F/u cards in 2 mo. 3. Essential hypertension  Stable. 4. Spinal stenosis of lumbar region without neurogenic claudication  Stable.     5.

## 2019-06-17 ENCOUNTER — OFFICE VISIT (OUTPATIENT)
Dept: FAMILY MEDICINE CLINIC | Facility: CLINIC | Age: 84
End: 2019-06-17
Payer: MEDICAID

## 2019-06-17 VITALS
HEART RATE: 72 BPM | BODY MASS INDEX: 22 KG/M2 | TEMPERATURE: 97 F | WEIGHT: 124 LBS | SYSTOLIC BLOOD PRESSURE: 158 MMHG | DIASTOLIC BLOOD PRESSURE: 80 MMHG

## 2019-06-17 DIAGNOSIS — I42.9 CARDIOMYOPATHY, UNSPECIFIED TYPE (HCC): ICD-10-CM

## 2019-06-17 DIAGNOSIS — I73.9 PVD (PERIPHERAL VASCULAR DISEASE) (HCC): ICD-10-CM

## 2019-06-17 DIAGNOSIS — R10.13 EPIGASTRIC ABDOMINAL PAIN: ICD-10-CM

## 2019-06-17 DIAGNOSIS — E11.9 TYPE 2 DIABETES MELLITUS WITHOUT COMPLICATION, WITHOUT LONG-TERM CURRENT USE OF INSULIN (HCC): ICD-10-CM

## 2019-06-17 DIAGNOSIS — R63.4 WEIGHT LOSS: ICD-10-CM

## 2019-06-17 DIAGNOSIS — E78.2 MIXED HYPERLIPIDEMIA: ICD-10-CM

## 2019-06-17 DIAGNOSIS — G62.9 NEUROPATHY: ICD-10-CM

## 2019-06-17 DIAGNOSIS — I10 ESSENTIAL HYPERTENSION: Primary | Chronic | ICD-10-CM

## 2019-06-17 PROCEDURE — 99212 OFFICE O/P EST SF 10 MIN: CPT | Performed by: FAMILY MEDICINE

## 2019-06-17 PROCEDURE — 99215 OFFICE O/P EST HI 40 MIN: CPT | Performed by: FAMILY MEDICINE

## 2019-06-17 PROCEDURE — 81003 URINALYSIS AUTO W/O SCOPE: CPT | Performed by: FAMILY MEDICINE

## 2019-06-17 PROCEDURE — 36416 COLLJ CAPILLARY BLOOD SPEC: CPT | Performed by: FAMILY MEDICINE

## 2019-06-17 PROCEDURE — 82962 GLUCOSE BLOOD TEST: CPT | Performed by: FAMILY MEDICINE

## 2019-06-17 RX ORDER — AMLODIPINE BESYLATE 2.5 MG/1
2.5 TABLET ORAL DAILY
Qty: 90 TABLET | Refills: 3 | Status: SHIPPED | OUTPATIENT
Start: 2019-06-17 | End: 2020-01-13 | Stop reason: ALTCHOICE

## 2019-06-17 RX ORDER — METOPROLOL SUCCINATE 100 MG/1
100 TABLET, EXTENDED RELEASE ORAL DAILY
Qty: 30 TABLET | Refills: 0 | Status: SHIPPED | OUTPATIENT
Start: 2019-06-17 | End: 2019-07-16

## 2019-06-17 RX ORDER — ENALAPRIL MALEATE 10 MG/1
10 TABLET ORAL DAILY
Qty: 90 TABLET | Refills: 3 | Status: SHIPPED | OUTPATIENT
Start: 2019-06-17 | End: 2020-01-13

## 2019-06-17 RX ORDER — ATORVASTATIN CALCIUM 40 MG/1
40 TABLET, FILM COATED ORAL NIGHTLY
Qty: 30 TABLET | Refills: 5 | Status: SHIPPED | OUTPATIENT
Start: 2019-06-17 | End: 2020-01-13

## 2019-06-17 RX ORDER — CLOPIDOGREL BISULFATE 75 MG/1
75 TABLET ORAL
Qty: 90 TABLET | Refills: 11 | Status: CANCELLED | OUTPATIENT
Start: 2019-06-17 | End: 2020-06-16

## 2019-06-17 RX ORDER — OMEPRAZOLE 40 MG/1
40 CAPSULE, DELAYED RELEASE ORAL DAILY
Qty: 90 CAPSULE | Refills: 3 | Status: SHIPPED | OUTPATIENT
Start: 2019-06-17 | End: 2020-01-13

## 2019-06-17 NOTE — PROGRESS NOTES
24 lb weight loss  nikolay adams  Has epigastric pain. No nause vomiting no stool with blood no constipatnion no diarrhea.   has no hunger   No temps      Toes and feet numb for months        Patient's past medical surgical family social history was reviewed. daily. Takes along with Enalapril 20 mg  Dispense: 90 tablet; Refill: 3  - LIPID PANEL; Future  - CBC WITH DIFFERENTIAL WITH PLATELET; Future  - COMP METABOLIC PANEL (14); Future  - EKG 12-LEAD    4.  Essential hypertension  Continue meds  - Metoprolol Succ

## 2019-06-18 ENCOUNTER — OFFICE VISIT (OUTPATIENT)
Dept: FAMILY MEDICINE CLINIC | Facility: CLINIC | Age: 84
End: 2019-06-18
Payer: MEDICAID

## 2019-06-18 VITALS
SYSTOLIC BLOOD PRESSURE: 121 MMHG | HEART RATE: 64 BPM | RESPIRATION RATE: 13 BRPM | BODY MASS INDEX: 22 KG/M2 | TEMPERATURE: 98 F | WEIGHT: 123.81 LBS | DIASTOLIC BLOOD PRESSURE: 68 MMHG

## 2019-06-18 DIAGNOSIS — E11.9 DIABETES MELLITUS WITHOUT COMPLICATION (HCC): Primary | ICD-10-CM

## 2019-06-18 PROCEDURE — 99213 OFFICE O/P EST LOW 20 MIN: CPT | Performed by: PHYSICIAN ASSISTANT

## 2019-06-18 PROCEDURE — 82962 GLUCOSE BLOOD TEST: CPT | Performed by: PHYSICIAN ASSISTANT

## 2019-06-18 PROCEDURE — 36416 COLLJ CAPILLARY BLOOD SPEC: CPT | Performed by: PHYSICIAN ASSISTANT

## 2019-06-18 PROCEDURE — 99212 OFFICE O/P EST SF 10 MIN: CPT | Performed by: PHYSICIAN ASSISTANT

## 2019-06-18 RX ORDER — GLIPIZIDE 10 MG/1
10 TABLET ORAL
Qty: 180 TABLET | Refills: 3 | Status: SHIPPED | OUTPATIENT
Start: 2019-06-18 | End: 2020-01-13

## 2019-06-18 NOTE — PROGRESS NOTES
HPI:   Diabetes   He presents for his initial diabetic visit. He has type 2 diabetes mellitus. His disease course has been worsening. Pertinent negatives for hypoglycemia include no dizziness or headaches.  Pertinent negatives for diabetes include no blurre due on 03/22/2012  Diabetes Care A1C due on 05/03/2019  Annual Depression Screen due on 07/16/2019  LDL Control due on 11/03/2019  Fall Risk Screening due on 11/09/2019  Influenza Vaccine Completed    Past Medical History:     Past Medical History:   Diagn service: Not on file        Active member of club or organization: Not on file        Attends meetings of clubs or organizations: Not on file        Relationship status: Not on file      Intimate partner violence:        Fear of current or ex partner: Not Right Ear: Tympanic membrane and ear canal normal. Tympanic membrane is not erythematous. No cerumen present  Left Ear: Tympanic membrane and ear canal normal. Tympanic membrane is not erythematous.  No cerumen present  Nose: Nose normal.   Eyes: Conjunct

## 2019-06-19 ENCOUNTER — LAB ENCOUNTER (OUTPATIENT)
Dept: LAB | Age: 84
End: 2019-06-19
Attending: FAMILY MEDICINE
Payer: MEDICAID

## 2019-06-19 DIAGNOSIS — R10.13 EPIGASTRIC ABDOMINAL PAIN: ICD-10-CM

## 2019-06-19 DIAGNOSIS — Z00.00 ROUTINE GENERAL MEDICAL EXAMINATION AT A HEALTH CARE FACILITY: Primary | ICD-10-CM

## 2019-06-19 DIAGNOSIS — I42.9 CARDIOMYOPATHY, UNSPECIFIED TYPE (HCC): ICD-10-CM

## 2019-06-19 DIAGNOSIS — E11.9 TYPE 2 DIABETES MELLITUS WITHOUT COMPLICATION, WITHOUT LONG-TERM CURRENT USE OF INSULIN (HCC): ICD-10-CM

## 2019-06-19 DIAGNOSIS — E78.2 MIXED HYPERLIPIDEMIA: ICD-10-CM

## 2019-06-19 PROCEDURE — 80053 COMPREHEN METABOLIC PANEL: CPT

## 2019-06-19 PROCEDURE — 83036 HEMOGLOBIN GLYCOSYLATED A1C: CPT

## 2019-06-19 PROCEDURE — 93005 ELECTROCARDIOGRAM TRACING: CPT

## 2019-06-19 PROCEDURE — 82150 ASSAY OF AMYLASE: CPT

## 2019-06-19 PROCEDURE — 93010 ELECTROCARDIOGRAM REPORT: CPT | Performed by: FAMILY MEDICINE

## 2019-06-19 PROCEDURE — 83690 ASSAY OF LIPASE: CPT

## 2019-06-19 PROCEDURE — 36415 COLL VENOUS BLD VENIPUNCTURE: CPT

## 2019-06-19 PROCEDURE — 81001 URINALYSIS AUTO W/SCOPE: CPT

## 2019-06-19 PROCEDURE — 80061 LIPID PANEL: CPT

## 2019-06-19 PROCEDURE — 85025 COMPLETE CBC W/AUTO DIFF WBC: CPT

## 2019-06-19 NOTE — ASSESSMENT & PLAN NOTE
Will start Glipizide 10 mg PO BID. Discontinue Metformin 500 mg BID due to age. Instruct patient to monitor BG pre and post meals. Patient refuses to f/u with Dietician. Advise patient to have lab work up done.

## 2019-06-25 ENCOUNTER — TELEPHONE (OUTPATIENT)
Dept: CASE MANAGEMENT | Age: 84
End: 2019-06-25

## 2019-06-25 NOTE — TELEPHONE ENCOUNTER
Dr. Alberto Castillo,    The CT you ordered for Amanda Human has been denied by his insurance company. Patient has been notified and advised to f/u with you for his future plan of care.     Anthony Valderrama

## 2019-07-02 RX ORDER — BLOOD-GLUCOSE METER
EACH MISCELLANEOUS
Qty: 1 KIT | Refills: 0 | Status: SHIPPED | OUTPATIENT
Start: 2019-07-02

## 2019-07-02 RX ORDER — LANCETS
EACH MISCELLANEOUS
Qty: 200 EACH | Refills: 11 | Status: SHIPPED | OUTPATIENT
Start: 2019-07-02

## 2019-07-02 RX ORDER — BLOOD-GLUCOSE CONTROL, NORMAL
EACH MISCELLANEOUS
Qty: 2 VIAL | Refills: 2 | Status: SHIPPED | OUTPATIENT
Start: 2019-07-02

## 2019-07-02 NOTE — TELEPHONE ENCOUNTER
Refill passed per [facility] protocol.   Diabetes Medications  Protocol Criteria:  · Appointment scheduled in the past 6 months or the next 3 months  · A1C < 7.5 in the past 6 months  · Creatinine in the past 12 months  · Creatinine result < 1.5   Rece

## 2019-07-05 ENCOUNTER — TELEPHONE (OUTPATIENT)
Dept: FAMILY MEDICINE CLINIC | Facility: CLINIC | Age: 84
End: 2019-07-05

## 2019-07-05 NOTE — TELEPHONE ENCOUNTER
Contacted patient per [facility], to see how sugar levels are. Pts' wife states never picked up glucometer because of some issue. Informed this MA would contact pharmacy.     Contacted pharmacy who states issue resolved since 7/2, pt able to  meter tod

## 2019-07-09 NOTE — PROGRESS NOTES
[facility] Follow-up Visit    Amber which he has been on consistently for the last few weeks. He reports this has fully resolved his epigastric pain. He denies overt heartburn symptoms, recurrent nausea/vomiting. No history of hematemesis, dysphagia, odynophagia.     No significant chest • Myocardial infarction [facility]) 2011   • Other and unspecified hyperlipidemia    • Unspecified essential hypertension       Past Surgical History:   Procedure Laterality Date   • ANGIOGRAM     • COLONOSCOPY     • OTHER SURGICAL HISTORY      HEART SURGERY by mouth daily. Disp: 90 capsule Rfl: 3   Amitriptyline HCl 10 MG Oral Tab Take 1-2 tablets (10-20 mg total) by mouth nightly.  Disp: 60 tablet Rfl: 0       Allergies:  No Known Allergies    ROS:   CONSTITUTIONAL:  negative for fevers, chills  EYES:  negati valve regurgitation, MI, who presents for evaluation of weight loss      1.  Weight loss/poor appetite/history of colon polyps: Outside of epigastric pain as detailed below, the patient has no other significant upper or lower GI symptoms with the exception low lipase. Mild elevation in alk phos with normal AST/ALT. Pancreatitis unlikely. No evidence of choledocholithiasis. Given that the patient had full symptom resolution on PPI therapy I would favor acid mediated etiology.   He may continue omeprazole 4 alternatives to upper endoscopy/enteroscopy with the patient [who demonstrated understanding], including but not limited to the risks of bleeding, infection, pain, as well as the risks of anesthesia and perforation all leading to prolonged hospitalization

## 2019-07-15 ENCOUNTER — OFFICE VISIT (OUTPATIENT)
Dept: FAMILY MEDICINE CLINIC | Facility: CLINIC | Age: 84
End: 2019-07-15
Payer: MEDICAID

## 2019-07-15 VITALS
SYSTOLIC BLOOD PRESSURE: 110 MMHG | HEART RATE: 59 BPM | BODY MASS INDEX: 24 KG/M2 | WEIGHT: 131 LBS | TEMPERATURE: 98 F | DIASTOLIC BLOOD PRESSURE: 62 MMHG

## 2019-07-15 DIAGNOSIS — E11.65 UNCONTROLLED TYPE 2 DIABETES MELLITUS WITH HYPERGLYCEMIA (HCC): ICD-10-CM

## 2019-07-15 DIAGNOSIS — I10 ESSENTIAL HYPERTENSION: Chronic | ICD-10-CM

## 2019-07-15 DIAGNOSIS — I25.10 ATHEROSCLEROSIS OF NATIVE CORONARY ARTERY OF NATIVE HEART WITHOUT ANGINA PECTORIS: Primary | Chronic | ICD-10-CM

## 2019-07-15 PROCEDURE — 99214 OFFICE O/P EST MOD 30 MIN: CPT | Performed by: FAMILY MEDICINE

## 2019-07-15 NOTE — PROGRESS NOTES
Has 122 in am an d177 after dinner  114 in am on 7/14  7/15 107 in am    Has gained 6 lbs    appetite is good. No temor worse than normal no dizziness. No hypoglycemic episodes    Diabetic Visit    Patient denies problems with their medication.   Denies

## 2019-07-16 ENCOUNTER — TELEPHONE (OUTPATIENT)
Dept: GASTROENTEROLOGY | Facility: CLINIC | Age: 84
End: 2019-07-16

## 2019-07-16 ENCOUNTER — TELEPHONE (OUTPATIENT)
Dept: FAMILY MEDICINE CLINIC | Facility: CLINIC | Age: 84
End: 2019-07-16

## 2019-07-16 ENCOUNTER — OFFICE VISIT (OUTPATIENT)
Dept: GASTROENTEROLOGY | Facility: CLINIC | Age: 84
End: 2019-07-16
Payer: MEDICAID

## 2019-07-16 VITALS
WEIGHT: 131.81 LBS | TEMPERATURE: 98 F | BODY MASS INDEX: 23.07 KG/M2 | HEART RATE: 72 BPM | DIASTOLIC BLOOD PRESSURE: 69 MMHG | SYSTOLIC BLOOD PRESSURE: 126 MMHG | HEIGHT: 63.5 IN

## 2019-07-16 DIAGNOSIS — R10.13 EPIGASTRIC PAIN: Primary | ICD-10-CM

## 2019-07-16 DIAGNOSIS — R63.4 WEIGHT LOSS: ICD-10-CM

## 2019-07-16 DIAGNOSIS — R63.0 POOR APPETITE: ICD-10-CM

## 2019-07-16 DIAGNOSIS — Z12.11 COLON CANCER SCREENING: Primary | ICD-10-CM

## 2019-07-16 DIAGNOSIS — Z86.010 HX OF COLONIC POLYPS: ICD-10-CM

## 2019-07-16 DIAGNOSIS — R63.4 WEIGHT LOSS, UNINTENTIONAL: Primary | ICD-10-CM

## 2019-07-16 DIAGNOSIS — Z86.010 HISTORY OF COLON POLYPS: ICD-10-CM

## 2019-07-16 DIAGNOSIS — R10.13 EPIGASTRIC PAIN: ICD-10-CM

## 2019-07-16 DIAGNOSIS — R10.13 EPIGASTRIC ABDOMINAL PAIN: ICD-10-CM

## 2019-07-16 DIAGNOSIS — I10 ESSENTIAL HYPERTENSION: Chronic | ICD-10-CM

## 2019-07-16 DIAGNOSIS — R63.4 WEIGHT LOSS, UNINTENTIONAL: ICD-10-CM

## 2019-07-16 PROCEDURE — 99214 OFFICE O/P EST MOD 30 MIN: CPT | Performed by: NURSE PRACTITIONER

## 2019-07-16 RX ORDER — AMITRIPTYLINE HYDROCHLORIDE 10 MG/1
TABLET, FILM COATED ORAL
Qty: 30 TABLET | Refills: 0 | Status: SHIPPED | OUTPATIENT
Start: 2019-07-16 | End: 2019-08-20

## 2019-07-16 RX ORDER — METOPROLOL SUCCINATE 100 MG/1
100 TABLET, EXTENDED RELEASE ORAL DAILY
Qty: 90 TABLET | Refills: 1 | Status: SHIPPED | OUTPATIENT
Start: 2019-07-16 | End: 2020-01-13

## 2019-07-16 NOTE — TELEPHONE ENCOUNTER
Dr. Horan/Cardiology Staff-    Please advise on [facility] APN message below for clarification on plavix medication, thank you. Pt scheduled to undergo colonoscopy/upper endoscopy on 8/13/19 with Dr. Neal Clark.

## 2019-07-16 NOTE — TELEPHONE ENCOUNTER
Will review w/ MB in office. Reviewed with Dr. Lonnie Salas. Patient may hold Plavix for 3 days prior to Colonoscopy/Upper endoscopy.

## 2019-07-16 NOTE — TELEPHONE ENCOUNTER
Thank you for bringing this to my attention. In deeper review of his chart, I see from  Dr. Ramachandran Friend' [address] [place] [address] on 6/17/19 he was instructed to stop plavix.  Probably due to bare metal stent (noted 1/3/17 in LOV w/Dr. Rick Dunaway)

## 2019-07-16 NOTE — TELEPHONE ENCOUNTER
Cardiology-    Please see [facility] APN message below. Patient/son believed plavix was discontinued in November? Is this accurate? Or is he supposed to be on it?

## 2019-07-16 NOTE — TELEPHONE ENCOUNTER
Pt called and have a referral for a CT scan when he called he said they stated the  insurance company will not pay cover the Ct scan      Please advise

## 2019-07-16 NOTE — PATIENT INSTRUCTIONS
-Schedule colonoscopy and EGD w/ possible biopsy w/earlier available MD w/ MAC due to medical hx  Dx: screening, hx polyps, weight loss  -Prep: Split dose Colyte or equivalent  -Anti-platelets and anti-coagulants: None  -Diabetes meds: Hold glipizide day b

## 2019-07-16 NOTE — TELEPHONE ENCOUNTER
Nursing: related to my office visit today. I noticed the patient was previously on Plavix but this was discontinued in November. The patient and his son did not recall the specifics of whether this was accurate or an error given cardiac hx.   Please conta

## 2019-07-16 NOTE — TELEPHONE ENCOUNTER
Scheduled for:  Colonoscopy [facility]  Provider Name: Dr Pau Albert  Date: Callie Anila 8/13/19  Location: [facility]  Sedation: MAC conchita Cantu  Time: 9:30am  Prep: split dose colyte  Meds/Allergies Reconciled?: NKDA  Diagnosis with codes:  HCP Z86.010, screening Z12.11

## 2019-07-17 NOTE — TELEPHONE ENCOUNTER
Refill passed per [facility] protocol.   Refill Protocol Appointment Criteria  · Appointment scheduled in the past 6 months or in the next 3 months  Recent Outpatient Visits            Today Weight loss, unintentional    [facility], [facility], [facility] [address] [place] [address],     Office Visit    5 months ago Atherosclerosis of native coronary artery of native heart without angina pectoris    [facility], [facility], [address], [address] [facility], [address] [place] [address]

## 2019-07-18 NOTE — TELEPHONE ENCOUNTER
Managed care - Please advise on PA for CT scan was previously denied however patient has recently seen by GI, please [facility] 7/16/19.

## 2019-07-18 NOTE — TELEPHONE ENCOUNTER
Noted.    Nursing: I have reordered the CT scan with the additional clinical diagnoses - please let me know if I need to do anything further. Thank you.

## 2019-07-18 NOTE — TELEPHONE ENCOUNTER
Yes, CT scan does require an authorization. CT scan Dr Karin Crockett ordered was denied by the health plan. The denial letter from the health plan was faxed to Dr Karin Crockett.      Thank you

## 2019-07-18 NOTE — TELEPHONE ENCOUNTER
Nursing: I did not personally call this patient. I believe the only 2 pending items for this patient include whether or not he was supposed to be on Plavix. This is been clarified as below.     There was also a question of the CT scan which was ordered by

## 2019-07-18 NOTE — TELEPHONE ENCOUNTER
Given my most recent office evaluation with the patient and his significant weight loss since January, I would recommend proceeding with a CT scan. Nursing: Would I need to reorder this or are we able to authorize the existing CT order?   I believe this

## 2019-07-18 NOTE — TELEPHONE ENCOUNTER
CSS THIS TE IS CLOSED. Spoke to pt son, Kirit Montes (OK per NICHOLAS) and reviewed [facility] message below. No further questions or concerns at this time.

## 2019-07-18 NOTE — TELEPHONE ENCOUNTER
[address]- PA was denied for the patient by insurance if there is more clinical information since last ordered by PCP then we can reorder and have manage care work on PA, otherwise it may still get denied.

## 2019-07-30 ENCOUNTER — OFFICE VISIT (OUTPATIENT)
Dept: CARDIOLOGY CLINIC | Facility: CLINIC | Age: 84
End: 2019-07-30
Payer: MEDICAID

## 2019-07-30 ENCOUNTER — TELEPHONE (OUTPATIENT)
Dept: GASTROENTEROLOGY | Facility: CLINIC | Age: 84
End: 2019-07-30

## 2019-07-30 VITALS
OXYGEN SATURATION: 98 % | BODY MASS INDEX: 23 KG/M2 | DIASTOLIC BLOOD PRESSURE: 72 MMHG | SYSTOLIC BLOOD PRESSURE: 135 MMHG | WEIGHT: 133 LBS | RESPIRATION RATE: 20 BRPM | HEART RATE: 66 BPM

## 2019-07-30 DIAGNOSIS — I10 ESSENTIAL HYPERTENSION: ICD-10-CM

## 2019-07-30 DIAGNOSIS — E78.2 MIXED HYPERLIPIDEMIA: Primary | ICD-10-CM

## 2019-07-30 DIAGNOSIS — R10.13 EPIGASTRIC PAIN: ICD-10-CM

## 2019-07-30 DIAGNOSIS — R07.89 OTHER CHEST PAIN: ICD-10-CM

## 2019-07-30 DIAGNOSIS — R63.4 UNINTENTIONAL WEIGHT LOSS: Primary | ICD-10-CM

## 2019-07-30 DIAGNOSIS — I25.10 ATHEROSCLEROSIS OF NATIVE CORONARY ARTERY OF NATIVE HEART WITHOUT ANGINA PECTORIS: ICD-10-CM

## 2019-07-30 PROCEDURE — 99214 OFFICE O/P EST MOD 30 MIN: CPT | Performed by: INTERNAL MEDICINE

## 2019-07-30 NOTE — PATIENT INSTRUCTIONS
See LYNNE Perez 3 months and Dr. Mariaelena Flores in the spring    Schedule Lexiscan stress test if persistent chest pain    To help with leg swelling keep feet elevated, avoid salt, use support stockings,walk when able

## 2019-07-30 NOTE — TELEPHONE ENCOUNTER
Patients insurance has denied the auth request for CT abdomen and pelvis. Please see the denial reason below and contact the patient with the next plan of treatment.      Thank you,  [facility]

## 2019-07-30 NOTE — PROGRESS NOTES
[facility] CLINIC  PROGRESS NOTE    Mickey Holder is a 80year old male. Patient presents with:   Follow - Up  Cardiomyopathy  Mitral Valve Disease  Edema: Bilateral feet  Chest Pain: Occasional     HPI:   This is a pleasant 80year old male with history o mouth 2 (two) times daily before meals. Disp: 180 tablet Rfl: 3   amLODIPine Besylate 2.5 MG Oral Tab Take 1 tablet (2.5 mg total) by mouth daily. Disp: 90 tablet Rfl: 3   atorvastatin 40 MG Oral Tab Take 1 tablet (40 mg total) by mouth nightly.  Disp: 30 t adenopathy,no bruits  LUNGS: clear to auscultation  CARDIO: regular rate and rhythm,nl H5,M2, 2/6 systolic ejection murmur  GI: good BS's,no masses, HSM or tenderness  EXTREMITIES: no cyanosis, clubbing trace ankle edema  NEURO: no focal deficits  PSYCH:al

## 2019-07-31 NOTE — TELEPHONE ENCOUNTER
Nursing: I have dictated a letter for appeal purposes. Please feel free to edit if needed prior to submitting.

## 2019-07-31 NOTE — TELEPHONE ENCOUNTER
[address]- Please advise on a possible US first? Or if you would like to start appeal process on CT scan.

## 2019-07-31 NOTE — TELEPHONE ENCOUNTER
Nursing: A CT scan would be ideal given significant weight loss (#25) over the last 6 months.   If we are not successful in obtaining authorization, then a abdominal ultrasound would be a reasonable secondary choice

## 2019-07-31 NOTE — TELEPHONE ENCOUNTER
Courtney/[employer]- Can you please send/fax to  [phone] entire denial letter so we may proceed with appeal.

## 2019-08-01 NOTE — TELEPHONE ENCOUNTER
Spoke to patients son states that he will bring patient in today to sign forms as  will here until 6 pm today.     [address]- FYI

## 2019-08-02 NOTE — TELEPHONE ENCOUNTER
Received signed authorized representative form. Dictated letter from [facility], Charisse and signed form was faxed to fax [phone]-- [address] w/ ATTN: Appeals written on cover sheet. Awaiting decision.     [facility]- FYI      Received signed Juan Lean

## 2019-08-07 ENCOUNTER — TELEPHONE (OUTPATIENT)
Dept: CARDIOLOGY CLINIC | Facility: CLINIC | Age: 84
End: 2019-08-07

## 2019-08-07 NOTE — TELEPHONE ENCOUNTER
Scheduled for 8/14/19. CARD NUC STRESS TEST LEXISCAN (CBV=[id number]/) [id number]  Order #: [id number].  #:[id number] FUTURE   Priority: Routine  Class: EHV - RFL   Resulting Agency: [facility]  Test ID: [id number]  Future Order [employer]

## 2019-08-07 NOTE — TELEPHONE ENCOUNTER
Pt procedure/Testing: CARD NUC STRESS TEST LEXISCAN   Pt insurance contacted:  [employer]. [employer]                                                                     Rep. Contacted:  [employer]. [employer]     Dx: I25.10 (ICD-10-CM) - 414.01 (ICD-9-CM) - [employer]

## 2019-08-12 ENCOUNTER — TELEPHONE (OUTPATIENT)
Dept: GASTROENTEROLOGY | Facility: CLINIC | Age: 84
End: 2019-08-12

## 2019-08-12 NOTE — TELEPHONE ENCOUNTER
HAMMAD RN covering- please f/u up with patients insurance and investigate on how to do appeal through insurance [facility] community by calling [phone], I have also placed a copy of all forms on your desk, please utilize to process appeal. I was informed from [address] [place] [address] Street

## 2019-08-12 NOTE — TELEPHONE ENCOUNTER
[facility] [address]-    Pt took the glipizide today and was supposed to hold it today and tomorrow (day of procedure). The enalapril is OK today since it is once daily, but must hold tomorrow morning. OK to proceed with CLN/EGD tomorrow? Please advise, than you.

## 2019-08-12 NOTE — TELEPHONE ENCOUNTER
Noted.    Nursing: The patient is already scheduled for a bidirectional study. Given the information we currently have I would favor an abdominal ultrasound which I will order today.     Pending endoscopic/ultrasound evaluation, could consider additional i

## 2019-08-12 NOTE — TELEPHONE ENCOUNTER
Called medicaid spoke to [place] was transferred to [facility]. Called that [address] was told that CT scan was still denied.  Was transferred to customer care to [place] spoke to [address] [place] [address] where she connected me Eloina Carrero the RN reviewer stated

## 2019-08-12 NOTE — TELEPHONE ENCOUNTER
Pt contacted and states the call was not regarding insurance but prep questions.  I reviewed ALL prep instructions step by step including procedure date, arrival time, clear liquid diet, split dose prep times, confirmed  to and from the procedure and

## 2019-08-12 NOTE — TELEPHONE ENCOUNTER
Spoke to pt son, Maryse Pizarro (OK per NICHOLAS) and reviewed Dr. Kathleen Esposito message below, he verbalized understanding of all and did not have further questions or concerns at this time.

## 2019-08-12 NOTE — TELEPHONE ENCOUNTER
Okay to proceed, but he should take some 7-up or gingerale to make sure he doesn't get HYPOGLYCEMIC. He should also check his blood sugar tonight if he is able, to make sure he doesn't go too low.     If he gets hypoglycemic or has any symptoms, he should S

## 2019-08-13 ENCOUNTER — HOSPITAL ENCOUNTER (OUTPATIENT)
Facility: HOSPITAL | Age: 84
Setting detail: HOSPITAL OUTPATIENT SURGERY
Discharge: HOME | End: 2019-08-13
Attending: INTERNAL MEDICINE | Admitting: INTERNAL MEDICINE
Payer: MEDICAID

## 2019-08-13 ENCOUNTER — ANESTHESIA EVENT (OUTPATIENT)
Dept: ENDOSCOPY | Facility: HOSPITAL | Age: 84
End: 2019-08-13
Payer: MEDICAID

## 2019-08-13 ENCOUNTER — ANESTHESIA (OUTPATIENT)
Dept: ENDOSCOPY | Facility: HOSPITAL | Age: 84
End: 2019-08-13
Payer: MEDICAID

## 2019-08-13 DIAGNOSIS — K31.7 GASTRIC POLYP: ICD-10-CM

## 2019-08-13 DIAGNOSIS — Z12.11 COLON CANCER SCREENING: ICD-10-CM

## 2019-08-13 DIAGNOSIS — K44.9 HIATAL HERNIA: Primary | ICD-10-CM

## 2019-08-13 DIAGNOSIS — R10.13 EPIGASTRIC ABDOMINAL PAIN: ICD-10-CM

## 2019-08-13 DIAGNOSIS — R63.4 WEIGHT LOSS: ICD-10-CM

## 2019-08-13 DIAGNOSIS — K64.8 INTERNAL HEMORRHOIDS WITHOUT COMPLICATION: ICD-10-CM

## 2019-08-13 DIAGNOSIS — Z86.010 HX OF COLONIC POLYPS: ICD-10-CM

## 2019-08-13 DIAGNOSIS — K25.3 CAMERON LESION, ACUTE: ICD-10-CM

## 2019-08-13 DIAGNOSIS — K63.5 POLYP OF ASCENDING COLON: ICD-10-CM

## 2019-08-13 LAB — GLUCOSE BLDC GLUCOMTR-MCNC: 96 MG/DL (ref 70–99)

## 2019-08-13 PROCEDURE — 0DB98ZX EXCISION OF DUODENUM, VIA NATURAL OR ARTIFICIAL OPENING ENDOSCOPIC, DIAGNOSTIC: ICD-10-PCS | Performed by: INTERNAL MEDICINE

## 2019-08-13 PROCEDURE — 43239 EGD BIOPSY SINGLE/MULTIPLE: CPT | Performed by: INTERNAL MEDICINE

## 2019-08-13 PROCEDURE — 45380 COLONOSCOPY AND BIOPSY: CPT | Performed by: INTERNAL MEDICINE

## 2019-08-13 PROCEDURE — 0DB68ZX EXCISION OF STOMACH, VIA NATURAL OR ARTIFICIAL OPENING ENDOSCOPIC, DIAGNOSTIC: ICD-10-PCS | Performed by: INTERNAL MEDICINE

## 2019-08-13 PROCEDURE — 0DBK8ZX EXCISION OF ASCENDING COLON, VIA NATURAL OR ARTIFICIAL OPENING ENDOSCOPIC, DIAGNOSTIC: ICD-10-PCS | Performed by: INTERNAL MEDICINE

## 2019-08-13 RX ORDER — LIDOCAINE HYDROCHLORIDE 10 MG/ML
INJECTION, SOLUTION EPIDURAL; INFILTRATION; INTRACAUDAL; PERINEURAL AS NEEDED
Status: DISCONTINUED | OUTPATIENT
Start: 2019-08-13 | End: 2019-08-13 | Stop reason: SURG

## 2019-08-13 RX ORDER — SODIUM CHLORIDE, SODIUM LACTATE, POTASSIUM CHLORIDE, CALCIUM CHLORIDE 600; 310; 30; 20 MG/100ML; MG/100ML; MG/100ML; MG/100ML
INJECTION, SOLUTION INTRAVENOUS CONTINUOUS
Status: DISCONTINUED | OUTPATIENT
Start: 2019-08-13 | End: 2019-08-13

## 2019-08-13 RX ORDER — DEXTROSE MONOHYDRATE 25 G/50ML
50 INJECTION, SOLUTION INTRAVENOUS
Status: DISCONTINUED | OUTPATIENT
Start: 2019-08-13 | End: 2019-08-13

## 2019-08-13 RX ORDER — NALOXONE HYDROCHLORIDE 0.4 MG/ML
80 INJECTION, SOLUTION INTRAMUSCULAR; INTRAVENOUS; SUBCUTANEOUS AS NEEDED
Status: DISCONTINUED | OUTPATIENT
Start: 2019-08-13 | End: 2019-08-13

## 2019-08-13 RX ADMIN — SODIUM CHLORIDE, SODIUM LACTATE, POTASSIUM CHLORIDE, CALCIUM CHLORIDE: 600; 310; 30; 20 INJECTION, SOLUTION INTRAVENOUS at 09:50:00

## 2019-08-13 RX ADMIN — LIDOCAINE HYDROCHLORIDE 25 MG: 10 INJECTION, SOLUTION EPIDURAL; INFILTRATION; INTRACAUDAL; PERINEURAL at 09:11:00

## 2019-08-13 NOTE — OPERATIVE REPORT
ESOPHAGOGASTRODUODENOSCOPY (EGD) & COLONOSCOPY REPORT    Francis Ruiz     1930 Age 80year old   PCP Gely Rausch.  Alfreda Turk, DO Endoscopist Harry Ball MD     Date of procedure: 19    Procedure: EGD w/cold biopsy & Colonoscopy w/co with washing. I then carefully withdrew the instrument from the patient who tolerated the procedure well. Complications: None    EGD findings:      1. Esophagus:  The squamocolumnar junction was noted at 35 cm and appeared mildly irregular without signi abdominal imaging as ordered. · Follow-up in 1-2 months with Alba Ace [phone]. · If ongoing weight loss issues need CTA of the abdomen/pelvis to evaluate for chronic mesenteric ischemia. · Monitor for blood in the stool.  If having more than

## 2019-08-13 NOTE — TELEPHONE ENCOUNTER
I spoke with Tyrone Brooks from [facility] at  [phone] who states the member has to go through a [employer] by either   1) submitting a letter and medical records [facility] [employer] [address]  [address] [facility],

## 2019-08-13 NOTE — ANESTHESIA POSTPROCEDURE EVALUATION
Patient: Vahid White    Procedure Summary     Date:  08/13/19 Room / Location:  [address] [place] [address] ENDOSCOPY 01 / [address] [place] [address] ENDOSCOPY    Anesthesia Start:  2154 Anesthesia Stop:      Procedures:       COLONOSCOPY (N/A )      ESOPHAGOGASTRODUODENOSCOPY (EGD) (N/A ) Reta Burgos

## 2019-08-13 NOTE — ANESTHESIA PREPROCEDURE EVALUATION
Anesthesia PreOp Note    HPI:     Leslye Squires is a 80year old male who presents for preoperative consultation requested by: Floyd Mcdonough MD    Date of Surgery: 8/13/2019    Procedure(s):  COLONOSCOPY  ESOPHAGOGASTRODUODENOSCOPY (EGD)  Indication Taking   Glucose Blood (ONETOUCH ULTRA BLUE) In Vitro Strip Use to test glucose twice daily Disp: 200 strip Rfl: 11 Taking   Blood Glucose Calibration (ONETOUCH ULTRA CONTROL) In Vitro Solution Use to test glucose twice daily Disp: 2 vial Rfl: 2 Taking   O insecurity:        Worry: Not on file        Inability: Not on file      Transportation needs:        Medical: Not on file        Non-medical: Not on file    Tobacco Use      Smoking status: Never Smoker      Smokeless tobacco: Never Used    Substance and 06/19/2019    BUN 12 06/19/2019    CREATSERUM 0.81 06/19/2019     (H) 06/19/2019    CA 9.4 06/19/2019          Vital Signs: There is no height or weight on file to calculate BMI.   vitals were not taken for this visit.    There were no vitals filed

## 2019-08-13 NOTE — H&P
History & Physical Examination    Patient Name: Mickey Holder  MRN: [id number]  CSN: [id number]  YOB: 1930    Diagnosis: weight loss, poor PO intake, hx of polyps, epigastric discomfort      Medications Prior to Admission:  AMITRIPTYLINE Diastolic dysfunction     grade 1   • Mitral valve regurgitation     moderate   • Myocardial infarction [facility]) 2011   • Other and unspecified hyperlipidemia    • Unspecified essential hypertension    • Visual impairment      Past Surgical History:   Procedu

## 2019-08-14 ENCOUNTER — HOSPITAL ENCOUNTER (OUTPATIENT)
Dept: NUCLEAR MEDICINE | Facility: HOSPITAL | Age: 84
Discharge: HOME | End: 2019-08-14
Attending: INTERNAL MEDICINE
Payer: MEDICAID

## 2019-08-14 ENCOUNTER — HOSPITAL ENCOUNTER (OUTPATIENT)
Dept: CV DIAGNOSTICS | Facility: HOSPITAL | Age: 84
Discharge: HOME | End: 2019-08-14
Attending: INTERNAL MEDICINE
Payer: MEDICAID

## 2019-08-14 VITALS
WEIGHT: 132 LBS | DIASTOLIC BLOOD PRESSURE: 57 MMHG | SYSTOLIC BLOOD PRESSURE: 131 MMHG | HEART RATE: 57 BPM | RESPIRATION RATE: 15 BRPM | BODY MASS INDEX: 24.92 KG/M2 | OXYGEN SATURATION: 99 % | HEIGHT: 61 IN

## 2019-08-14 DIAGNOSIS — I10 ESSENTIAL HYPERTENSION: ICD-10-CM

## 2019-08-14 DIAGNOSIS — R07.89 OTHER CHEST PAIN: ICD-10-CM

## 2019-08-14 DIAGNOSIS — E78.2 MIXED HYPERLIPIDEMIA: ICD-10-CM

## 2019-08-14 DIAGNOSIS — I25.10 ATHEROSCLEROSIS OF NATIVE CORONARY ARTERY OF NATIVE HEART WITHOUT ANGINA PECTORIS: ICD-10-CM

## 2019-08-14 PROCEDURE — 93018 CV STRESS TEST I&R ONLY: CPT | Performed by: INTERNAL MEDICINE

## 2019-08-14 PROCEDURE — 93017 CV STRESS TEST TRACING ONLY: CPT | Performed by: INTERNAL MEDICINE

## 2019-08-14 PROCEDURE — 78452 HT MUSCLE IMAGE SPECT MULT: CPT | Performed by: INTERNAL MEDICINE

## 2019-08-14 PROCEDURE — 93016 CV STRESS TEST SUPVJ ONLY: CPT | Performed by: INTERNAL MEDICINE

## 2019-08-14 RX ORDER — 0.9 % SODIUM CHLORIDE 0.9 %
VIAL (ML) INJECTION
Status: COMPLETED
Start: 2019-08-14 | End: 2019-08-14

## 2019-08-14 RX ADMIN — 0.9 % SODIUM CHLORIDE 10 ML: 0.9 % VIAL (ML) INJECTION at 10:39:00

## 2019-08-14 NOTE — TELEPHONE ENCOUNTER
Called son and he asked if we can try to go through appeal.    Called BS community spoke to [address] [place] [address] Dr would be the last option if 2nd level appeal.     To start the 2nd level appeal done through [facility] office needs to send

## 2019-08-14 NOTE — TELEPHONE ENCOUNTER
Called nicolle and [facility]- [facility], per message below and I spoke to [facility] RN she stated the member needs to do one of the 3 options below.

## 2019-08-15 ENCOUNTER — TELEPHONE (OUTPATIENT)
Dept: GASTROENTEROLOGY | Facility: CLINIC | Age: 84
End: 2019-08-15

## 2019-08-15 NOTE — TELEPHONE ENCOUNTER
Resent fax, clinicals, denial letter, and letter from [facility] to below fax and appeal # and updated authorized rep form stating Bijal Santos.

## 2019-08-15 NOTE — TELEPHONE ENCOUNTER
Resent fax, clinicals, denial letter, and letter from [facility] to below fax and appeal # and updated authorized rep form stating Bernard Jimenez.                       Anand Guajardoigned  10:13 AM                Re 2nd level appeal for CT scan - on authorized r

## 2019-08-15 NOTE — TELEPHONE ENCOUNTER
Re 2nd level appeal for CT scan - on authorized rep from - under name of auth rep it just says [facility]- that line needs to have a persons name there- could be Dianelys Asencio - pls correct and fax with appeal #  - fax [phone]    appeal # [phone]

## 2019-08-16 ENCOUNTER — TELEPHONE (OUTPATIENT)
Dept: GASTROENTEROLOGY | Facility: CLINIC | Age: 84
End: 2019-08-16

## 2019-08-16 NOTE — TELEPHONE ENCOUNTER
Path reviewed, letter sent to patient. 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 Can cause Vitamin B12 def  (has been low before) and iron def.      Jonnathan Mcgowan, next time patient follows up with you, please check:  -iron studies  -repeat vitamin b12  -intrin

## 2019-08-21 ENCOUNTER — TELEPHONE (OUTPATIENT)
Dept: GASTROENTEROLOGY | Facility: CLINIC | Age: 84
End: 2019-08-21

## 2019-08-21 RX ORDER — AMITRIPTYLINE HYDROCHLORIDE 10 MG/1
TABLET, FILM COATED ORAL
Qty: 90 TABLET | Refills: 1 | Status: SHIPPED | OUTPATIENT
Start: 2019-08-21 | End: 2019-12-16

## 2019-08-21 NOTE — TELEPHONE ENCOUNTER
I mailed out colonoscopy/EGD results letter to pt  Updated health maintenance  Entered into  1 yr EGD recall  Recall EGD in 1 year per.  EGD done 8/13/19    I called and spoke to his grandson Julieth Ricardo, and I left a message for pt to return my call, so we can m

## 2019-08-21 NOTE — TELEPHONE ENCOUNTER
PA approved son Shantell Montoya notified to have pt schedule at [phone]. Approval letter sent to scanning.

## 2019-08-21 NOTE — TELEPHONE ENCOUNTER
Patients nicolle Faulkner was notified that PA was CT scan was approved to call [phone] to schedule scan. Approval letter sent to scanning.      [address] [place] [address]- FY

## 2019-08-21 NOTE — TELEPHONE ENCOUNTER
Pt is scheduled on 9/23/19 but should Re-schedule follow-up for 2-3 months instead, per Dr Pau Albert

## 2019-08-22 NOTE — TELEPHONE ENCOUNTER
I called and left detailed message for pt to return call to Re-schedule existing appt 9/23/19 for a 2-3 month follow-up per Dr Iban Guthrie. Please see Emmanuel Thomas in 2-3 months to discuss further.     See letter 8/16/29

## 2019-08-22 NOTE — TELEPHONE ENCOUNTER
Refill passed per [facility] protocol.   Refill Protocol Appointment Criteria  · Appointment scheduled in the past 6 months or in the next 3 months  Recent Outpatient Visits            3 weeks ago Mixed hyperlipidemia    [facility]

## 2019-08-30 ENCOUNTER — HOSPITAL ENCOUNTER (OUTPATIENT)
Dept: CT IMAGING | Age: 84
Discharge: HOME | End: 2019-08-30
Attending: FAMILY MEDICINE
Payer: MEDICAID

## 2019-08-30 DIAGNOSIS — R10.13 EPIGASTRIC PAIN: ICD-10-CM

## 2019-08-30 DIAGNOSIS — R63.4 UNINTENTIONAL WEIGHT LOSS: ICD-10-CM

## 2019-08-30 DIAGNOSIS — R63.4 WEIGHT LOSS: ICD-10-CM

## 2019-08-30 DIAGNOSIS — R10.13 EPIGASTRIC ABDOMINAL PAIN: ICD-10-CM

## 2019-08-30 LAB — CREAT BLD-MCNC: 0.8 MG/DL (ref 0.7–1.3)

## 2019-08-30 PROCEDURE — 82565 ASSAY OF CREATININE: CPT

## 2019-08-30 PROCEDURE — 74160 CT ABDOMEN W/CONTRAST: CPT | Performed by: FAMILY MEDICINE

## 2019-10-07 ENCOUNTER — TELEPHONE (OUTPATIENT)
Dept: FAMILY MEDICINE CLINIC | Facility: CLINIC | Age: 84
End: 2019-10-07

## 2019-10-07 NOTE — TELEPHONE ENCOUNTER
Prior authorization for Omeprazole 40MG dr capsules completed w/ [employer] on cover my meds [id number], turn around time 1-5 days.

## 2019-10-07 NOTE — TELEPHONE ENCOUNTER
Fax received requesting PA. Key -  [id number]. Current Outpatient Medications:  Omeprazole 40 MG Oral Capsule Delayed Release Take 1 capsule (40 mg total) by mouth daily.  Disp: 90 capsule Rfl: 3

## 2019-10-09 NOTE — TELEPHONE ENCOUNTER
PA denied. Patient is able to receive 2 capsules/tablets/packets a day for a max of 120 days within 365 days. This is the duration limit set by the patient's pharmacy benefit plan.

## 2019-10-10 ENCOUNTER — OFFICE VISIT (OUTPATIENT)
Dept: OPHTHALMOLOGY | Facility: CLINIC | Age: 84
End: 2019-10-10
Payer: MEDICAID

## 2019-10-10 DIAGNOSIS — E11.9 TYPE 2 DIABETES MELLITUS WITHOUT RETINOPATHY (HCC): Primary | ICD-10-CM

## 2019-10-10 DIAGNOSIS — H02.886 MEIBOMIAN GLAND DYSFUNCTION (MGD) OF BOTH EYES: ICD-10-CM

## 2019-10-10 DIAGNOSIS — H25.13 AGE-RELATED NUCLEAR CATARACT OF BOTH EYES: ICD-10-CM

## 2019-10-10 DIAGNOSIS — H02.883 MEIBOMIAN GLAND DYSFUNCTION (MGD) OF BOTH EYES: ICD-10-CM

## 2019-10-10 DIAGNOSIS — H11.003 PTERYGIUM EYE, BILATERAL: ICD-10-CM

## 2019-10-10 PROCEDURE — 99243 OFF/OP CNSLTJ NEW/EST LOW 30: CPT | Performed by: OPHTHALMOLOGY

## 2019-10-10 PROCEDURE — 92015 DETERMINE REFRACTIVE STATE: CPT | Performed by: OPHTHALMOLOGY

## 2019-10-10 NOTE — TELEPHONE ENCOUNTER
Please notify the patient or his family about the restriction. He will have to pay out of pocket for the medication.

## 2019-10-10 NOTE — ASSESSMENT & PLAN NOTE
Discussed cataracts in detail with patient's son. Discussed that cataracts are advanced enough to consider cataract surgery, but it would be patient's choice.      Options:  1.) Continue with same glasses  2.) Update glasses  3.) refer to Dr. Alessandro Mcknight fo

## 2019-10-10 NOTE — PROGRESS NOTES
Naila Escobar is a 80year old male.     HPI:     HPI     Consult      Additional comments: Consult per Dr. Daniel Chavira              Diabetic Eye Exam      Additional comments: Pt has been a diabetic for 3 months   3 months on pills/  0 years on Insulin Oral Tab take 1-2 tablets by mouth at bedtime Disp: 90 tablet Rfl: 1   METOPROLOL SUCCINATE  MG Oral Tablet 24 Hr Take 1 tablet (100 mg total) by mouth daily.  Disp: 90 tablet Rfl: 1   Blood Glucose Monitoring Suppl (ONETOUCH ULTRA 2) w/Device Does no Extraocular Movement       Right Left     Full, Ortho Full, Ortho          Dilation     Both eyes:  1.0% Mydriacyl and 2.5% Kobe Synephrine @ 4:24 PM            Slit Lamp and Fundus Exam     Slit Lamp Exam       Right Left    Lids/Lashes Dermatochalasis, Me gently rub the eyelashes and then rinse thoroughly with warm water. Patient should also use artificial tears (any over the counter brand is okay) up to 4 times per day as needed for dry eye symptoms.       Pterygium eye, bilateral  No treatment is needed

## 2019-10-14 ENCOUNTER — OFFICE VISIT (OUTPATIENT)
Dept: FAMILY MEDICINE CLINIC | Facility: CLINIC | Age: 84
End: 2019-10-14
Payer: MEDICAID

## 2019-10-14 VITALS
BODY MASS INDEX: 26 KG/M2 | WEIGHT: 138 LBS | HEART RATE: 77 BPM | RESPIRATION RATE: 20 BRPM | SYSTOLIC BLOOD PRESSURE: 168 MMHG | DIASTOLIC BLOOD PRESSURE: 78 MMHG | TEMPERATURE: 98 F

## 2019-10-14 DIAGNOSIS — I42.9 CARDIOMYOPATHY, UNSPECIFIED TYPE (HCC): ICD-10-CM

## 2019-10-14 DIAGNOSIS — E11.65 UNCONTROLLED TYPE 2 DIABETES MELLITUS WITH HYPERGLYCEMIA (HCC): ICD-10-CM

## 2019-10-14 DIAGNOSIS — H25.9 AGE-RELATED CATARACT OF BOTH EYES, UNSPECIFIED AGE-RELATED CATARACT TYPE: ICD-10-CM

## 2019-10-14 DIAGNOSIS — I25.10 ATHEROSCLEROSIS OF NATIVE CORONARY ARTERY OF NATIVE HEART WITHOUT ANGINA PECTORIS: ICD-10-CM

## 2019-10-14 DIAGNOSIS — G47.30 SEVERE SLEEP APNEA: ICD-10-CM

## 2019-10-14 DIAGNOSIS — I10 ESSENTIAL HYPERTENSION: Primary | ICD-10-CM

## 2019-10-14 DIAGNOSIS — R63.0 POOR APPETITE: ICD-10-CM

## 2019-10-14 DIAGNOSIS — K44.9 HIATAL HERNIA: ICD-10-CM

## 2019-10-14 PROCEDURE — 90662 IIV NO PRSV INCREASED AG IM: CPT | Performed by: FAMILY MEDICINE

## 2019-10-14 PROCEDURE — 99215 OFFICE O/P EST HI 40 MIN: CPT | Performed by: FAMILY MEDICINE

## 2019-10-14 PROCEDURE — 90471 IMMUNIZATION ADMIN: CPT | Performed by: FAMILY MEDICINE

## 2019-10-14 RX ORDER — AMLODIPINE BESYLATE 5 MG/1
5 TABLET ORAL DAILY
Qty: 90 TABLET | Refills: 3 | Status: SHIPPED | OUTPATIENT
Start: 2019-10-14 | End: 2019-11-05

## 2019-10-15 NOTE — PROGRESS NOTES
From: Kael Santoro  To: Nora Kaey NP  Sent: 8/19/2019 12:44 PM CDT  Subject: Non-Urgent Medical Question    When is my depo shot due?   Needs cataract surgery  No sob no chest pain  No n/v  Has some abd bloating  \"They didn't give him his medicine\"  ppi states son    sheng is better  Doesn't us a sleep mask as directed.     Patient's past medical surgical family social history was rev ([facility] [address]  Plan: stable    (H25.9) Age-related cataract of both eyes, unspecified age-related cataract type  Plan: OPHTHALMOLOGY - EXTERNAL        Referral  (Emy Olivas) Essential hypertension  (primary encounter diagnosis)  Plan: amLODIPine Besylate 5 MG Oral Tab

## 2019-11-05 ENCOUNTER — OFFICE VISIT (OUTPATIENT)
Dept: CARDIOLOGY CLINIC | Facility: CLINIC | Age: 84
End: 2019-11-05
Payer: MEDICAID

## 2019-11-05 ENCOUNTER — TELEPHONE (OUTPATIENT)
Dept: CARDIOLOGY CLINIC | Facility: CLINIC | Age: 84
End: 2019-11-05

## 2019-11-05 VITALS
DIASTOLIC BLOOD PRESSURE: 70 MMHG | RESPIRATION RATE: 16 BRPM | WEIGHT: 144 LBS | HEIGHT: 61 IN | HEART RATE: 70 BPM | SYSTOLIC BLOOD PRESSURE: 137 MMHG | BODY MASS INDEX: 27.19 KG/M2

## 2019-11-05 DIAGNOSIS — I25.10 ATHEROSCLEROSIS OF NATIVE CORONARY ARTERY OF NATIVE HEART WITHOUT ANGINA PECTORIS: Primary | ICD-10-CM

## 2019-11-05 PROCEDURE — 99214 OFFICE O/P EST MOD 30 MIN: CPT | Performed by: NURSE PRACTITIONER

## 2019-11-05 NOTE — TELEPHONE ENCOUNTER
Patients son/Jose calling for patient who was seen today. Patients son/Jose calling with the list of medications patient currently takes. Any questions please call at:[phone],thanks.   MEDICATIONS:  Amtriptyline 10 MG  Amlodipine 2.5 MG  Atorvastatin 40 MG  Enlapril Maletate 10 MG  Grlipizide 10 MG  Metotrolol 100 MG  Omeprazole 40 MG

## 2019-11-05 NOTE — PROGRESS NOTES
Hannah Walker is a 80year old male. Patient presents with: Follow - Up  Cardiomyopathy  Mitral Valve Disease  Edema: lower extremity edema  Dizziness  CAD    HPI:   Patient comes in today for follow-up.  He sees Dr. Lynn Murrieta he has a history of coronar Use to test glucose twice daily 1 kit 0   • Glucose Blood (ONETOUCH ULTRA BLUE) In Vitro Strip Use to test glucose twice daily 200 strip 11   • Blood Glucose Calibration (ONETOUCH ULTRA CONTROL) In Vitro Solution Use to test glucose twice daily 2 vial 2 He had a stress test that showed a fixed defect but no new ischemia.   His cholesterol is at goal so at this time we will continue present management he does have a trace lower extremity edema he will elevate his feet otherwise no changes follow-up with

## 2019-11-05 NOTE — TELEPHONE ENCOUNTER
Patient unsure of meds at [facility]  Patient rx list updated and [facility] for review

## 2019-11-05 NOTE — PATIENT INSTRUCTIONS
1. Confirm medications  2. Call if any new symptoms  3. Elevate feet  4.  Follow  Up with Dr. Gayla Obrien in the Spring

## 2019-11-06 NOTE — TELEPHONE ENCOUNTER
meds reviewed, no plavix appears to be stopped by PCP in June.  Pls discuss with Dr. Heidy Hernández if he wants this to still be held

## 2019-11-11 NOTE — PROGRESS NOTES
[facility] Follow-up Visit    Amber esophageal, gastric cancer  - No family history of IBD.     Prior endoscopies:  August 2019 EGD/colonoscopy performed by Dr. Johana Gonzalez with MAC r/t dyspepsia, weight loss, history of colon polyps, findings: large hiatal hernia, small gastric polyp (adenoma) wi degeneration Neg       Social History: Social History    Tobacco Use      Smoking status: Never Smoker      Smokeless tobacco: Never Used    Alcohol use: No    Drug use: No       Medications (Active prior to today's visit):  Current Outpatient Medications cold intolerance and heat intolerance  MUSCULOSKELETAL:  negative for  joint stiffness and joint swelling  BEHAVIOR/PSYCH:  negative for depressed mood    PHYSICAL EXAM:   Blood pressure 137/65, pulse 67, height 5' 1\" (1.549 m), weight 142 lb (64.4 kg). questions/concerns addressed. 2.  Gastric polyp/intestinal metaplasia:  Will be due for repeat EGD in August 2020        Recommend:  -Continue omeprazole 40 mg daily  -Due for repeat EGD in August 2020  -Follow-up as needed          Orders This Visit:  N

## 2019-11-13 RX ORDER — ASPIRIN 81 MG/1
81 TABLET ORAL DAILY
Qty: 90 TABLET | Refills: 0 | Status: SHIPPED | OUTPATIENT
Start: 2019-11-13 | End: 2020-02-18

## 2019-11-13 NOTE — TELEPHONE ENCOUNTER
Reviewed with Dr. Beck Nunez. Okay to be off plavix as long as he is taking 81 mg ASA daily. LMTCB Detailed message left (HIPAA verified) for son that Dr. Beck Nunez wants him to be taking an 81 mg ASA. Order entered and sent to pharmacy.

## 2019-11-16 RX ORDER — AMITRIPTYLINE HYDROCHLORIDE 10 MG/1
TABLET, FILM COATED ORAL
Qty: 90 TABLET | Refills: 0 | OUTPATIENT
Start: 2019-11-16

## 2019-11-18 ENCOUNTER — OFFICE VISIT (OUTPATIENT)
Dept: GASTROENTEROLOGY | Facility: CLINIC | Age: 84
End: 2019-11-18
Payer: MEDICAID

## 2019-11-18 VITALS
DIASTOLIC BLOOD PRESSURE: 65 MMHG | SYSTOLIC BLOOD PRESSURE: 137 MMHG | WEIGHT: 142 LBS | BODY MASS INDEX: 26.81 KG/M2 | HEART RATE: 67 BPM | HEIGHT: 61 IN

## 2019-11-18 DIAGNOSIS — R63.4 WEIGHT LOSS: ICD-10-CM

## 2019-11-18 DIAGNOSIS — R10.13 EPIGASTRIC PAIN: Primary | ICD-10-CM

## 2019-11-18 PROCEDURE — 99213 OFFICE O/P EST LOW 20 MIN: CPT | Performed by: NURSE PRACTITIONER

## 2019-12-17 RX ORDER — AMITRIPTYLINE HYDROCHLORIDE 10 MG/1
TABLET, FILM COATED ORAL
Qty: 90 TABLET | Refills: 1 | Status: SHIPPED | OUTPATIENT
Start: 2019-12-17 | End: 2020-03-21

## 2019-12-17 NOTE — TELEPHONE ENCOUNTER
Refill passed per [facility] protocol.   Refill Protocol Appointment Criteria  · Appointment scheduled in the past 6 months or in the next 3 months  Recent Outpatient Visits            4 weeks ago Epigastric pain    [facility], [address] [place] [address]

## 2020-01-13 ENCOUNTER — HOSPITAL ENCOUNTER (OUTPATIENT)
Dept: GENERAL RADIOLOGY | Age: 85
Discharge: HOME | End: 2020-01-13
Attending: FAMILY MEDICINE
Payer: MEDICAID

## 2020-01-13 ENCOUNTER — OFFICE VISIT (OUTPATIENT)
Dept: FAMILY MEDICINE CLINIC | Facility: CLINIC | Age: 85
End: 2020-01-13
Payer: MEDICAID

## 2020-01-13 VITALS
WEIGHT: 149 LBS | SYSTOLIC BLOOD PRESSURE: 151 MMHG | HEIGHT: 61 IN | HEART RATE: 65 BPM | DIASTOLIC BLOOD PRESSURE: 66 MMHG | BODY MASS INDEX: 28.13 KG/M2 | RESPIRATION RATE: 20 BRPM

## 2020-01-13 DIAGNOSIS — M54.2 NECK PAIN: ICD-10-CM

## 2020-01-13 DIAGNOSIS — E11.9 TYPE 2 DIABETES MELLITUS WITHOUT RETINOPATHY (HCC): ICD-10-CM

## 2020-01-13 DIAGNOSIS — E78.2 MIXED HYPERLIPIDEMIA: ICD-10-CM

## 2020-01-13 DIAGNOSIS — K21.9 GASTROESOPHAGEAL REFLUX DISEASE WITHOUT ESOPHAGITIS: ICD-10-CM

## 2020-01-13 DIAGNOSIS — I25.10 ATHEROSCLEROSIS OF NATIVE CORONARY ARTERY OF NATIVE HEART WITHOUT ANGINA PECTORIS: Chronic | ICD-10-CM

## 2020-01-13 DIAGNOSIS — I73.9 CLAUDICATION (HCC): ICD-10-CM

## 2020-01-13 DIAGNOSIS — I42.9 CARDIOMYOPATHY, UNSPECIFIED TYPE (HCC): ICD-10-CM

## 2020-01-13 DIAGNOSIS — E13.42 POLYNEUROPATHY DUE TO SECONDARY DIABETES (HCC): ICD-10-CM

## 2020-01-13 DIAGNOSIS — I10 ESSENTIAL HYPERTENSION: Primary | Chronic | ICD-10-CM

## 2020-01-13 PROCEDURE — 72050 X-RAY EXAM NECK SPINE 4/5VWS: CPT | Performed by: FAMILY MEDICINE

## 2020-01-13 PROCEDURE — 99215 OFFICE O/P EST HI 40 MIN: CPT | Performed by: FAMILY MEDICINE

## 2020-01-13 RX ORDER — ATORVASTATIN CALCIUM 40 MG/1
40 TABLET, FILM COATED ORAL NIGHTLY
Qty: 90 TABLET | Refills: 3 | Status: SHIPPED | OUTPATIENT
Start: 2020-01-13 | End: 2021-01-05

## 2020-01-13 RX ORDER — ENALAPRIL MALEATE 10 MG/1
10 TABLET ORAL DAILY
Qty: 90 TABLET | Refills: 3 | Status: SHIPPED | OUTPATIENT
Start: 2020-01-13 | End: 2020-10-22

## 2020-01-13 RX ORDER — AMLODIPINE BESYLATE 5 MG/1
5 TABLET ORAL DAILY
Qty: 90 TABLET | Refills: 3 | Status: SHIPPED | OUTPATIENT
Start: 2020-01-13 | End: 2021-01-05

## 2020-01-13 RX ORDER — OMEPRAZOLE 40 MG/1
40 CAPSULE, DELAYED RELEASE ORAL DAILY
Qty: 90 CAPSULE | Refills: 3 | Status: SHIPPED | OUTPATIENT
Start: 2020-01-13 | End: 2021-01-05

## 2020-01-13 RX ORDER — METOPROLOL SUCCINATE 100 MG/1
100 TABLET, EXTENDED RELEASE ORAL DAILY
Qty: 90 TABLET | Refills: 1 | Status: SHIPPED | OUTPATIENT
Start: 2020-01-13 | End: 2020-09-28

## 2020-01-13 RX ORDER — GLIPIZIDE 10 MG/1
10 TABLET ORAL
Qty: 180 TABLET | Refills: 3 | Status: SHIPPED | OUTPATIENT
Start: 2020-01-13 | End: 2021-01-05

## 2020-01-14 NOTE — PROGRESS NOTES
Sugars are high he does not check very frequently does not follow diabetic diet lives most of his time in [place].    No shortness of breath no chest pain no nausea vomiting. As he has been doing for probably 10 years he complains of bilateral foot pain. was no cyanosis or edema  Pulses the popliteal pulse and the carotid pulses were all normal.  Carotid pulse was  without bruit  Skin was intact. There were no exceptional nevi.   Feet are cool with poor dorsalis pedis pulse  Monofilament exam was positive (COS=10692); Future    7. Cardiomyopathy, unspecified type ([address]  Continue on the ACE  - Enalapril Maleate 10 MG Oral Tab; Take 1 tablet (10 mg total) by mouth daily. Takes along with Enalapril 20 mg  Dispense: 90 tablet; Refill: 3    8.  Mixed hyperlipidem

## 2020-01-16 ENCOUNTER — TELEPHONE (OUTPATIENT)
Dept: FAMILY MEDICINE CLINIC | Facility: CLINIC | Age: 85
End: 2020-01-16

## 2020-01-18 ENCOUNTER — APPOINTMENT (OUTPATIENT)
Dept: LAB | Age: 85
End: 2020-01-18
Attending: FAMILY MEDICINE
Payer: MEDICAID

## 2020-01-18 DIAGNOSIS — E11.9 TYPE 2 DIABETES MELLITUS WITHOUT RETINOPATHY (HCC): ICD-10-CM

## 2020-01-18 DIAGNOSIS — E13.42 POLYNEUROPATHY DUE TO SECONDARY DIABETES (HCC): ICD-10-CM

## 2020-01-18 DIAGNOSIS — I73.9 CLAUDICATION (HCC): ICD-10-CM

## 2020-01-18 LAB
ALBUMIN SERPL-MCNC: 3.8 G/DL (ref 3.4–5)
ALBUMIN/GLOB SERPL: 1.1 {RATIO} (ref 1–2)
ALP LIVER SERPL-CCNC: 120 U/L (ref 45–117)
ALT SERPL-CCNC: 20 U/L (ref 16–61)
ANION GAP SERPL CALC-SCNC: 5 MMOL/L (ref 0–18)
AST SERPL-CCNC: 24 U/L (ref 15–37)
BILIRUB SERPL-MCNC: 1 MG/DL (ref 0.1–2)
BUN BLD-MCNC: 16 MG/DL (ref 7–18)
BUN/CREAT SERPL: 14.2 (ref 10–20)
CALCIUM BLD-MCNC: 9.2 MG/DL (ref 8.5–10.1)
CHLORIDE SERPL-SCNC: 108 MMOL/L (ref 98–112)
CHOLEST SMN-MCNC: 149 MG/DL (ref ?–200)
CO2 SERPL-SCNC: 30 MMOL/L (ref 21–32)
CREAT BLD-MCNC: 1.13 MG/DL (ref 0.7–1.3)
CREAT UR-SCNC: 166 MG/DL
EST. AVERAGE GLUCOSE BLD GHB EST-MCNC: 140 MG/DL (ref 68–126)
GLOBULIN PLAS-MCNC: 3.5 G/DL (ref 2.8–4.4)
GLUCOSE BLD-MCNC: 119 MG/DL (ref 70–99)
HBA1C MFR BLD HPLC: 6.5 % (ref ?–5.7)
HDLC SERPL-MCNC: 41 MG/DL (ref 40–59)
LDLC SERPL CALC-MCNC: 78 MG/DL (ref ?–100)
M PROTEIN MFR SERPL ELPH: 7.3 G/DL (ref 6.4–8.2)
MICROALBUMIN UR-MCNC: 0.69 MG/DL
MICROALBUMIN/CREAT 24H UR-RTO: 4.2 UG/MG (ref ?–30)
NONHDLC SERPL-MCNC: 108 MG/DL (ref ?–130)
OSMOLALITY SERPL CALC.SUM OF ELEC: 298 MOSM/KG (ref 275–295)
PATIENT FASTING Y/N/NP: YES
PATIENT FASTING Y/N/NP: YES
POTASSIUM SERPL-SCNC: 4.7 MMOL/L (ref 3.5–5.1)
SODIUM SERPL-SCNC: 143 MMOL/L (ref 136–145)
TRIGL SERPL-MCNC: 149 MG/DL (ref 30–149)
VLDLC SERPL CALC-MCNC: 30 MG/DL (ref 0–30)

## 2020-01-18 PROCEDURE — 82043 UR ALBUMIN QUANTITATIVE: CPT

## 2020-01-18 PROCEDURE — 36415 COLL VENOUS BLD VENIPUNCTURE: CPT

## 2020-01-18 PROCEDURE — 80061 LIPID PANEL: CPT

## 2020-01-18 PROCEDURE — 80053 COMPREHEN METABOLIC PANEL: CPT

## 2020-01-18 PROCEDURE — 83036 HEMOGLOBIN GLYCOSYLATED A1C: CPT

## 2020-01-18 PROCEDURE — 82570 ASSAY OF URINE CREATININE: CPT

## 2020-01-21 ENCOUNTER — TELEPHONE (OUTPATIENT)
Dept: FAMILY MEDICINE CLINIC | Facility: CLINIC | Age: 85
End: 2020-01-21

## 2020-01-21 NOTE — TELEPHONE ENCOUNTER
Mekhi Wiley, DO  P Em [facility] Lpn/Cma             Sugar very much improved continue on the current medication regimen cholesterol is fine liver kidney were fine.

## 2020-01-21 NOTE — TELEPHONE ENCOUNTER
Left detailed vm for son, Rupal Herman (under liz), to call us back regarding pt's test results. Please transfer to ext [phone].

## 2020-01-24 NOTE — TELEPHONE ENCOUNTER
Spoke to pt's daughter and relayed test results and Dr. KATARZYNA WALLACE [facility] recommendations. She verbalized understanding.

## 2020-01-29 ENCOUNTER — TELEPHONE (OUTPATIENT)
Dept: FAMILY MEDICINE CLINIC | Facility: CLINIC | Age: 85
End: 2020-01-29

## 2020-01-29 DIAGNOSIS — I73.9 PVD (PERIPHERAL VASCULAR DISEASE) (HCC): Primary | ICD-10-CM

## 2020-01-29 NOTE — TELEPHONE ENCOUNTER
US ARTERIAL DUPLEX LOWER EXTREMITY BILATERAL (CPT=93925)    The patient's health plan has denied your request for U/S. The denial has been faxed to you and your clinical staff's attn.      Thank you, Carroll

## 2020-02-03 NOTE — TELEPHONE ENCOUNTER
Qasim Moreno returned call informed of referral and information provided to schedule an appointment.

## 2020-02-18 RX ORDER — ASPIRIN 81 MG/1
TABLET, COATED ORAL
Qty: 90 TABLET | Refills: 0 | Status: SHIPPED | OUTPATIENT
Start: 2020-02-18 | End: 2020-05-27

## 2020-02-18 NOTE — TELEPHONE ENCOUNTER
Chart reviewed. No changes is medication. Meets protocol.  Rx sent  Antiplatelet Medications  Protocol Criteria:  · Appointment scheduled with Cardiology in the past 12 months or in the next 3 months  · CBC in past 12 months  · Hgb greater than 8g.dl  · Zena

## 2020-02-26 ENCOUNTER — LAB ENCOUNTER (OUTPATIENT)
Dept: LAB | Facility: HOSPITAL | Age: 85
End: 2020-02-26
Attending: RADIOLOGY
Payer: MEDICAID

## 2020-02-26 DIAGNOSIS — I73.9 PAD (PERIPHERAL ARTERY DISEASE) (HCC): ICD-10-CM

## 2020-02-26 DIAGNOSIS — I73.9 PAD (PERIPHERAL ARTERY DISEASE) (HCC): Primary | ICD-10-CM

## 2020-02-26 LAB
BASOPHILS # BLD AUTO: 0.08 X10(3) UL (ref 0–0.2)
BASOPHILS NFR BLD AUTO: 0.9 %
DEPRECATED RDW RBC AUTO: 41.8 FL (ref 35.1–46.3)
EOSINOPHIL # BLD AUTO: 0.39 X10(3) UL (ref 0–0.7)
EOSINOPHIL NFR BLD AUTO: 4.2 %
ERYTHROCYTE [DISTWIDTH] IN BLOOD BY AUTOMATED COUNT: 12.8 % (ref 11–15)
HCT VFR BLD AUTO: 41.4 % (ref 39–53)
HGB BLD-MCNC: 14.5 G/DL (ref 13–17.5)
IMM GRANULOCYTES # BLD AUTO: 0.03 X10(3) UL (ref 0–1)
IMM GRANULOCYTES NFR BLD: 0.3 %
LYMPHOCYTES # BLD AUTO: 2.9 X10(3) UL (ref 1–4)
LYMPHOCYTES NFR BLD AUTO: 31.5 %
MCH RBC QN AUTO: 31.8 PG (ref 26–34)
MCHC RBC AUTO-ENTMCNC: 35 G/DL (ref 31–37)
MCV RBC AUTO: 90.8 FL (ref 80–100)
MONOCYTES # BLD AUTO: 0.65 X10(3) UL (ref 0.1–1)
MONOCYTES NFR BLD AUTO: 7 %
NEUTROPHILS # BLD AUTO: 5.17 X10 (3) UL (ref 1.5–7.7)
NEUTROPHILS # BLD AUTO: 5.17 X10(3) UL (ref 1.5–7.7)
NEUTROPHILS NFR BLD AUTO: 56.1 %
PLATELET # BLD AUTO: 211 10(3)UL (ref 150–450)
RBC # BLD AUTO: 4.56 X10(6)UL (ref 3.8–5.8)
WBC # BLD AUTO: 9.2 X10(3) UL (ref 4–11)

## 2020-02-26 PROCEDURE — 36415 COLL VENOUS BLD VENIPUNCTURE: CPT

## 2020-02-26 PROCEDURE — 85025 COMPLETE CBC W/AUTO DIFF WBC: CPT

## 2020-02-26 NOTE — PLAN OF CARE
· You are scheduled to have Lower extremity angiogram.  · Do NOT eat or drink anything after Midnight  · Medications you are allowed to take can be taken with a sip of water. Use Betasept/ Hibiclens soap for 3 consecutive days.        ARRIVAL:  · Please

## 2020-02-27 ENCOUNTER — TELEPHONE (OUTPATIENT)
Dept: FAMILY MEDICINE CLINIC | Facility: CLINIC | Age: 85
End: 2020-02-27

## 2020-02-27 NOTE — TELEPHONE ENCOUNTER
Spoke with the patient's son named Therman Collet who is on HIPAA. (Name and  of pt verified). All results and recommendations reviewed. Son verbalizes understanding, denies further questions.         Notes recorded by Faraz Lund DO on 2020 at 1:0

## 2020-02-28 ENCOUNTER — TELEPHONE (OUTPATIENT)
Dept: FAMILY MEDICINE CLINIC | Facility: CLINIC | Age: 85
End: 2020-02-28

## 2020-03-09 ENCOUNTER — HOSPITAL ENCOUNTER (OUTPATIENT)
Dept: INTERVENTIONAL RADIOLOGY/VASCULAR | Facility: HOSPITAL | Age: 85
Discharge: HOME | End: 2020-03-09
Attending: RADIOLOGY | Admitting: RADIOLOGY
Payer: MEDICAID

## 2020-03-09 VITALS
OXYGEN SATURATION: 97 % | BODY MASS INDEX: 26 KG/M2 | DIASTOLIC BLOOD PRESSURE: 60 MMHG | WEIGHT: 140 LBS | RESPIRATION RATE: 14 BRPM | HEART RATE: 68 BPM | SYSTOLIC BLOOD PRESSURE: 127 MMHG

## 2020-03-09 DIAGNOSIS — I73.9 PAD (PERIPHERAL ARTERY DISEASE) (HCC): ICD-10-CM

## 2020-03-09 DIAGNOSIS — E13.42 POLYNEUROPATHY DUE TO SECONDARY DIABETES (HCC): Primary | ICD-10-CM

## 2020-03-09 LAB — GLUCOSE BLDC GLUCOMTR-MCNC: 130 MG/DL (ref 70–99)

## 2020-03-09 PROCEDURE — 36247 INS CATH ABD/L-EXT ART 3RD: CPT

## 2020-03-09 PROCEDURE — 75716 ARTERY X-RAYS ARMS/LEGS: CPT

## 2020-03-09 PROCEDURE — B41G1ZZ FLUOROSCOPY OF LEFT LOWER EXTREMITY ARTERIES USING LOW OSMOLAR CONTRAST: ICD-10-PCS | Performed by: RADIOLOGY

## 2020-03-09 PROCEDURE — 99152 MOD SED SAME PHYS/QHP 5/>YRS: CPT

## 2020-03-09 PROCEDURE — B41F1ZZ FLUOROSCOPY OF RIGHT LOWER EXTREMITY ARTERIES USING LOW OSMOLAR CONTRAST: ICD-10-PCS | Performed by: RADIOLOGY

## 2020-03-09 PROCEDURE — 82962 GLUCOSE BLOOD TEST: CPT

## 2020-03-09 PROCEDURE — 36415 COLL VENOUS BLD VENIPUNCTURE: CPT

## 2020-03-09 RX ORDER — LIDOCAINE HYDROCHLORIDE 20 MG/ML
INJECTION, SOLUTION EPIDURAL; INFILTRATION; INTRACAUDAL; PERINEURAL
Status: COMPLETED
Start: 2020-03-09 | End: 2020-03-09

## 2020-03-09 RX ORDER — HEPARIN SODIUM 1000 [USP'U]/ML
INJECTION, SOLUTION INTRAVENOUS; SUBCUTANEOUS
Status: DISCONTINUED
Start: 2020-03-09 | End: 2020-03-09 | Stop reason: WASHOUT

## 2020-03-09 RX ORDER — SODIUM CHLORIDE 9 MG/ML
INJECTION, SOLUTION INTRAVENOUS CONTINUOUS
Status: DISCONTINUED | OUTPATIENT
Start: 2020-03-09 | End: 2020-03-09

## 2020-03-09 RX ORDER — MIDAZOLAM HYDROCHLORIDE 1 MG/ML
INJECTION INTRAMUSCULAR; INTRAVENOUS
Status: COMPLETED
Start: 2020-03-09 | End: 2020-03-09

## 2020-03-09 RX ORDER — NITROGLYCERIN 20 MG/100ML
INJECTION INTRAVENOUS
Status: COMPLETED
Start: 2020-03-09 | End: 2020-03-09

## 2020-03-09 NOTE — IVS NOTE
Pt was able to drink no nausea or vomiting. Pt ambulated after bedrest and site remained soft and no hematoma. Discharge instructions given to pt's family.

## 2020-03-09 NOTE — BRIEF PROCEDURE NOTE
[facility] - [facility]  Procedure Note    Leslye Squires Patient Status:  Outpatient in a Bed    1930 MRN [id number]   Location [facility] Attending Keny Foster MD   Hosp Day # 0 PCP Tamika Schultz.  DO Jonathan

## 2020-03-21 RX ORDER — AMITRIPTYLINE HYDROCHLORIDE 10 MG/1
TABLET, FILM COATED ORAL
Qty: 90 TABLET | Refills: 0 | Status: SHIPPED | OUTPATIENT
Start: 2020-03-21 | End: 2020-05-16

## 2020-03-21 RX ORDER — CLOPIDOGREL BISULFATE 75 MG/1
75 TABLET ORAL
Qty: 90 TABLET | Refills: 0 | Status: SHIPPED | OUTPATIENT
Start: 2020-03-21 | End: 2020-07-14

## 2020-03-23 ENCOUNTER — TELEPHONE (OUTPATIENT)
Dept: FAMILY MEDICINE CLINIC | Facility: CLINIC | Age: 85
End: 2020-03-23

## 2020-03-23 ENCOUNTER — TELEPHONE (OUTPATIENT)
Dept: CARDIOLOGY CLINIC | Facility: CLINIC | Age: 85
End: 2020-03-23

## 2020-03-23 NOTE — TELEPHONE ENCOUNTER
Prior authorization for Omeprazole completed w/ [employer] on cover my meds Key: [id number]  , turn around time 3-5 days.

## 2020-03-23 NOTE — TELEPHONE ENCOUNTER
Fax received requesting PA through cover my meds. Use Key [id number]. Current Outpatient Medications   Medication Sig Dispense Refill   • Omeprazole 40 MG Oral Capsule Delayed Release Take 1 capsule (40 mg total) by mouth daily.  90 capsule 3

## 2020-03-23 NOTE — TELEPHONE ENCOUNTER
Has follow up w/MB 3/31. LOV w/RH 11/5 and MB 7/30/19. Spoke with son Shu Alexander, HIPAA verified. States that Neel Navarrete has been doing very well, no c/o SOB, swelling, pain. Agreed to reschedule to May 28.  Understands if patient develops symptoms to contact ca

## 2020-03-24 NOTE — TELEPHONE ENCOUNTER
Prior authorization has been denied for Omeprazole. Patients plan states medication is not covered due to not meeting criteria    Patients plan states medication is not covered due to Proton Pump Inhibitors Quantity Limit program criteria.  Patient is able

## 2020-03-30 NOTE — TELEPHONE ENCOUNTER
The patient called back and was informed. He will try [facility] and see the best price will call us back if needed.

## 2020-03-30 NOTE — TELEPHONE ENCOUNTER
3rd attempt to contact pt. Pt has appt on 7/14/20 with [facility] [place]. Will send no phone response letter home.

## 2020-05-16 RX ORDER — AMITRIPTYLINE HYDROCHLORIDE 10 MG/1
TABLET, FILM COATED ORAL
Qty: 90 TABLET | Refills: 0 | Status: SHIPPED | OUTPATIENT
Start: 2020-05-16 | End: 2020-06-28

## 2020-05-27 RX ORDER — ASPIRIN 81 MG/1
TABLET ORAL
Qty: 90 TABLET | Refills: 1 | Status: SHIPPED | OUTPATIENT
Start: 2020-05-27 | End: 2020-07-14

## 2020-05-28 ENCOUNTER — VIRTUAL PHONE E/M (OUTPATIENT)
Dept: CARDIOLOGY CLINIC | Facility: CLINIC | Age: 85
End: 2020-05-28
Payer: MEDICAID

## 2020-05-28 DIAGNOSIS — I10 ESSENTIAL HYPERTENSION: ICD-10-CM

## 2020-05-28 DIAGNOSIS — I25.10 ATHEROSCLEROSIS OF NATIVE CORONARY ARTERY OF NATIVE HEART WITHOUT ANGINA PECTORIS: Primary | ICD-10-CM

## 2020-05-28 DIAGNOSIS — E78.2 MIXED HYPERLIPIDEMIA: ICD-10-CM

## 2020-05-28 PROCEDURE — 99213 OFFICE O/P EST LOW 20 MIN: CPT | Performed by: INTERNAL MEDICINE

## 2020-05-28 NOTE — TELEPHONE ENCOUNTER
Meets refill protocol criteria. Refill sent.       Antiplatelet Medications  Protocol Criteria:  · Appointment scheduled with Cardiology in the past 12 months or in the next 3 months  · CBC in past 12 months  · Hgb greater than 8g.dl  · Platelets greater th

## 2020-05-28 NOTE — PROGRESS NOTES
Due to COVID-19 ACTION PLAN, the patient's office visit was converted to a phone visit. ? This patient verbally consents to a telephone visit. Patient states they are [place] and that they are speaking to me from their home.      It has been 10 was advised to call if they experience any new or worsening symptoms. ? Return in about 6 months (around 11/28/2020).   Ellis Cadena MD

## 2020-05-28 NOTE — PATIENT INSTRUCTIONS
Get a blood pressure machine so you can check your blood pressures daily for a week and call with results    Monitor and record resting blood pressure and pulse after 5 minutes of rest for 1 week and call with results.   When check blood pressure check 3 re

## 2020-06-28 RX ORDER — AMITRIPTYLINE HYDROCHLORIDE 10 MG/1
TABLET, FILM COATED ORAL
Qty: 90 TABLET | Refills: 0 | Status: SHIPPED | OUTPATIENT
Start: 2020-06-28 | End: 2020-07-07

## 2020-07-07 RX ORDER — AMITRIPTYLINE HYDROCHLORIDE 10 MG/1
TABLET, FILM COATED ORAL NIGHTLY
Qty: 90 TABLET | Refills: 0 | Status: SHIPPED | OUTPATIENT
Start: 2020-07-07 | End: 2020-09-26

## 2020-07-14 ENCOUNTER — OFFICE VISIT (OUTPATIENT)
Dept: FAMILY MEDICINE CLINIC | Facility: CLINIC | Age: 85
End: 2020-07-14
Payer: MEDICAID

## 2020-07-14 VITALS
BODY MASS INDEX: 28.13 KG/M2 | SYSTOLIC BLOOD PRESSURE: 140 MMHG | DIASTOLIC BLOOD PRESSURE: 73 MMHG | WEIGHT: 149 LBS | HEART RATE: 66 BPM | HEIGHT: 61 IN

## 2020-07-14 DIAGNOSIS — I10 ESSENTIAL HYPERTENSION: Primary | ICD-10-CM

## 2020-07-14 DIAGNOSIS — E78.2 MIXED HYPERLIPIDEMIA: ICD-10-CM

## 2020-07-14 DIAGNOSIS — E11.65 UNCONTROLLED TYPE 2 DIABETES MELLITUS WITH HYPERGLYCEMIA (HCC): ICD-10-CM

## 2020-07-14 PROCEDURE — 99213 OFFICE O/P EST LOW 20 MIN: CPT | Performed by: FAMILY MEDICINE

## 2020-07-14 NOTE — PROGRESS NOTES
Blood pressure 140/73, pulse 66, height 5' 1\" (1.549 m), weight 149 lb (67.6 kg). Patient presents today following up for diabetes, hypertension hyperlipidemia. Denies any pain. Does feel dizzy at times.   Son reports patient does not like to use his

## 2020-08-20 ENCOUNTER — OFFICE VISIT (OUTPATIENT)
Dept: FAMILY MEDICINE CLINIC | Facility: CLINIC | Age: 85
End: 2020-08-20
Payer: MEDICAID

## 2020-08-20 VITALS
BODY MASS INDEX: 28.32 KG/M2 | HEIGHT: 61 IN | WEIGHT: 150 LBS | SYSTOLIC BLOOD PRESSURE: 146 MMHG | HEART RATE: 60 BPM | DIASTOLIC BLOOD PRESSURE: 75 MMHG

## 2020-08-20 DIAGNOSIS — E11.9 TYPE 2 DIABETES MELLITUS WITHOUT COMPLICATION, WITHOUT LONG-TERM CURRENT USE OF INSULIN (HCC): ICD-10-CM

## 2020-08-20 DIAGNOSIS — I10 ESSENTIAL HYPERTENSION: ICD-10-CM

## 2020-08-20 DIAGNOSIS — Z76.89 ENCOUNTER TO ESTABLISH CARE: Primary | ICD-10-CM

## 2020-08-20 PROCEDURE — 3077F SYST BP >= 140 MM HG: CPT | Performed by: FAMILY MEDICINE

## 2020-08-20 PROCEDURE — 99214 OFFICE O/P EST MOD 30 MIN: CPT | Performed by: FAMILY MEDICINE

## 2020-08-20 PROCEDURE — 3008F BODY MASS INDEX DOCD: CPT | Performed by: FAMILY MEDICINE

## 2020-08-20 PROCEDURE — 3078F DIAST BP <80 MM HG: CPT | Performed by: FAMILY MEDICINE

## 2020-08-20 NOTE — PROGRESS NOTES
Patient presents with:  Establish Care    HPI:   Debbie Benson is a 80year old male who presents to clinic to establish care. Prior HbA1C: 6.5%  Dietary compliance: Good, eats small breakfast low sugar. Will have beans and tortillas for lunch. hypertension  -Blood pressure uncontrolled today. Patient takes enalapril, metoprolol and amlodipine for blood pressure. Advised patient to return to the office in 2 weeks for blood pressure check, and medication management.     RTC if no improvement in s

## 2020-08-22 ENCOUNTER — LAB ENCOUNTER (OUTPATIENT)
Dept: LAB | Age: 85
End: 2020-08-22
Attending: FAMILY MEDICINE
Payer: MEDICAID

## 2020-08-22 DIAGNOSIS — E11.9 TYPE 2 DIABETES MELLITUS WITHOUT COMPLICATION, WITHOUT LONG-TERM CURRENT USE OF INSULIN (HCC): ICD-10-CM

## 2020-08-22 LAB
ALBUMIN SERPL-MCNC: 3.7 G/DL (ref 3.4–5)
ALBUMIN/GLOB SERPL: 1 {RATIO} (ref 1–2)
ALP LIVER SERPL-CCNC: 134 U/L (ref 45–117)
ALT SERPL-CCNC: 23 U/L (ref 16–61)
ANION GAP SERPL CALC-SCNC: 7 MMOL/L (ref 0–18)
AST SERPL-CCNC: 27 U/L (ref 15–37)
BILIRUB SERPL-MCNC: 0.9 MG/DL (ref 0.1–2)
BUN BLD-MCNC: 12 MG/DL (ref 7–18)
BUN/CREAT SERPL: 12.1 (ref 10–20)
CALCIUM BLD-MCNC: 9 MG/DL (ref 8.5–10.1)
CHLORIDE SERPL-SCNC: 109 MMOL/L (ref 98–112)
CHOLEST SMN-MCNC: 145 MG/DL (ref ?–200)
CO2 SERPL-SCNC: 26 MMOL/L (ref 21–32)
CREAT BLD-MCNC: 0.99 MG/DL (ref 0.7–1.3)
CREAT UR-SCNC: 159 MG/DL
EST. AVERAGE GLUCOSE BLD GHB EST-MCNC: 146 MG/DL (ref 68–126)
GLOBULIN PLAS-MCNC: 3.7 G/DL (ref 2.8–4.4)
GLUCOSE BLD-MCNC: 123 MG/DL (ref 70–99)
HBA1C MFR BLD HPLC: 6.7 % (ref ?–5.7)
HDLC SERPL-MCNC: 38 MG/DL (ref 40–59)
LDLC SERPL CALC-MCNC: 76 MG/DL (ref ?–100)
M PROTEIN MFR SERPL ELPH: 7.4 G/DL (ref 6.4–8.2)
MICROALBUMIN UR-MCNC: 0.87 MG/DL
MICROALBUMIN/CREAT 24H UR-RTO: 5.5 UG/MG (ref ?–30)
NONHDLC SERPL-MCNC: 107 MG/DL (ref ?–130)
OSMOLALITY SERPL CALC.SUM OF ELEC: 295 MOSM/KG (ref 275–295)
PATIENT FASTING Y/N/NP: YES
PATIENT FASTING Y/N/NP: YES
POTASSIUM SERPL-SCNC: 4 MMOL/L (ref 3.5–5.1)
SODIUM SERPL-SCNC: 142 MMOL/L (ref 136–145)
TRIGL SERPL-MCNC: 155 MG/DL (ref 30–149)
VLDLC SERPL CALC-MCNC: 31 MG/DL (ref 0–30)

## 2020-08-22 PROCEDURE — 82043 UR ALBUMIN QUANTITATIVE: CPT

## 2020-08-22 PROCEDURE — 83036 HEMOGLOBIN GLYCOSYLATED A1C: CPT

## 2020-08-22 PROCEDURE — 36415 COLL VENOUS BLD VENIPUNCTURE: CPT

## 2020-08-22 PROCEDURE — 82570 ASSAY OF URINE CREATININE: CPT

## 2020-08-22 PROCEDURE — 80061 LIPID PANEL: CPT

## 2020-08-22 PROCEDURE — 80053 COMPREHEN METABOLIC PANEL: CPT

## 2020-09-26 RX ORDER — AMITRIPTYLINE HYDROCHLORIDE 10 MG/1
TABLET, FILM COATED ORAL
Qty: 90 TABLET | Refills: 0 | Status: SHIPPED | OUTPATIENT
Start: 2020-09-26

## 2020-09-28 DIAGNOSIS — I10 ESSENTIAL HYPERTENSION: Chronic | ICD-10-CM

## 2020-09-28 RX ORDER — METOPROLOL SUCCINATE 100 MG/1
100 TABLET, EXTENDED RELEASE ORAL DAILY
Qty: 90 TABLET | Refills: 1 | Status: SHIPPED | OUTPATIENT
Start: 2020-09-28 | End: 2021-01-04

## 2020-10-14 ENCOUNTER — TELEPHONE (OUTPATIENT)
Dept: FAMILY MEDICINE CLINIC | Facility: CLINIC | Age: 85
End: 2020-10-14

## 2020-10-14 NOTE — TELEPHONE ENCOUNTER
Pt son calling to ask if Dr Giuliana Pizano thinks it is ok for pt to travel to [place] at the end of this month.     Please advise

## 2020-10-15 NOTE — TELEPHONE ENCOUNTER
Same advice I would give patient/son regarding patient's wife. Should be okay to travel as long as compliant with taking medications. Would advise another repeat blood pressure check prior to going on vacation as it was elevated in the office.   Please re

## 2020-10-16 NOTE — TELEPHONE ENCOUNTER
Spoke with son Samy Connor and relayed MD message below. They will make an appointment for parents to be seen in office prior to travel.

## 2020-10-16 NOTE — TELEPHONE ENCOUNTER
Per [place] #[id number], patients daughter answered the phone, not on FYI. Asked to have Francis or Phyllis contact the office.

## 2020-10-22 ENCOUNTER — OFFICE VISIT (OUTPATIENT)
Dept: FAMILY MEDICINE CLINIC | Facility: CLINIC | Age: 85
End: 2020-10-22
Payer: MEDICAID

## 2020-10-22 VITALS
HEART RATE: 64 BPM | HEIGHT: 61 IN | TEMPERATURE: 97 F | BODY MASS INDEX: 28.51 KG/M2 | SYSTOLIC BLOOD PRESSURE: 124 MMHG | DIASTOLIC BLOOD PRESSURE: 78 MMHG | WEIGHT: 151 LBS

## 2020-10-22 DIAGNOSIS — H25.9 AGE-RELATED CATARACT OF BOTH EYES, UNSPECIFIED AGE-RELATED CATARACT TYPE: ICD-10-CM

## 2020-10-22 DIAGNOSIS — Z23 NEEDS FLU SHOT: ICD-10-CM

## 2020-10-22 DIAGNOSIS — M54.2 NECK PAIN: ICD-10-CM

## 2020-10-22 DIAGNOSIS — E11.9 TYPE 2 DIABETES MELLITUS WITHOUT COMPLICATION, WITHOUT LONG-TERM CURRENT USE OF INSULIN (HCC): ICD-10-CM

## 2020-10-22 DIAGNOSIS — I10 ESSENTIAL HYPERTENSION: Primary | ICD-10-CM

## 2020-10-22 PROCEDURE — 99213 OFFICE O/P EST LOW 20 MIN: CPT | Performed by: FAMILY MEDICINE

## 2020-10-22 PROCEDURE — 3074F SYST BP LT 130 MM HG: CPT | Performed by: FAMILY MEDICINE

## 2020-10-22 PROCEDURE — 90662 IIV NO PRSV INCREASED AG IM: CPT | Performed by: FAMILY MEDICINE

## 2020-10-22 PROCEDURE — 90471 IMMUNIZATION ADMIN: CPT | Performed by: FAMILY MEDICINE

## 2020-10-22 PROCEDURE — 3008F BODY MASS INDEX DOCD: CPT | Performed by: FAMILY MEDICINE

## 2020-10-22 PROCEDURE — 3078F DIAST BP <80 MM HG: CPT | Performed by: FAMILY MEDICINE

## 2020-10-22 RX ORDER — ENALAPRIL MALEATE 10 MG/1
10 TABLET ORAL DAILY
Qty: 90 TABLET | Refills: 1 | Status: SHIPPED | OUTPATIENT
Start: 2020-10-22 | End: 2021-07-21

## 2020-10-22 RX ORDER — TRAMADOL HYDROCHLORIDE 50 MG/1
50 TABLET ORAL EVERY 6 HOURS PRN
Qty: 30 TABLET | Refills: 0 | Status: SHIPPED | OUTPATIENT
Start: 2020-10-22 | End: 2021-05-10

## 2020-10-22 RX ORDER — ENALAPRIL MALEATE 20 MG/1
20 TABLET ORAL DAILY
Qty: 90 TABLET | Refills: 0 | Status: SHIPPED | OUTPATIENT
Start: 2020-10-22 | End: 2020-10-22

## 2020-10-22 NOTE — PROGRESS NOTES
HPI:   Mickey Holder is a 80year old male who presents to clinic for BP check . Has a home in [place] in [place] and plans to go with his wife for an extended period of time. His son accompanies him to the office today.   Denies chest pain, fausto OPHTHALMOLOGY - INTERNAL    4. Needs flu shot  - FLU VACC HIGH DOSE PRSV FREE    5. Neck pain  -Take Tylenol as needed and tramadol only for strong pain as needed[place]. Appropriate use discussed with patient.   - traMADol H

## 2020-11-03 RX ORDER — BLOOD SUGAR DIAGNOSTIC
STRIP MISCELLANEOUS
Qty: 100 EACH | Refills: 1 | Status: SHIPPED | OUTPATIENT
Start: 2020-11-03

## 2020-11-03 RX ORDER — LANCETS 30 GAUGE
1 EACH MISCELLANEOUS DAILY
Qty: 100 EACH | Refills: 1 | Status: SHIPPED | OUTPATIENT
Start: 2020-11-03

## 2020-11-03 NOTE — TELEPHONE ENCOUNTER
Fax received at [facility].  Pt requesting refills for the following:      Drug: Onetouch Ultra Blue In [employer]  Sig: Test one time daily  Qty: 100    Drug: Onetouch Delica Lancets 97U Miscellaneous  Sig: Test one time dsily  Qty:100

## 2020-12-02 RX ORDER — ASPIRIN 81 MG/1
81 TABLET ORAL DAILY
Qty: 90 TABLET | Refills: 0 | Status: SHIPPED | OUTPATIENT
Start: 2020-12-02 | End: 2021-02-08

## 2020-12-02 NOTE — TELEPHONE ENCOUNTER
Aspirin 81 mg rx request PASSED protocol. Rx request filled as noted by 5/28/2020 virtual visit notes patient was to resume Asprin.       Antiplatelet Medications  Protocol Criteria:  · Appointment scheduled with Cardiology in the past 12 months or in the n

## 2021-01-03 DIAGNOSIS — I10 ESSENTIAL HYPERTENSION: Chronic | ICD-10-CM

## 2021-01-04 DIAGNOSIS — I25.10 ATHEROSCLEROSIS OF NATIVE CORONARY ARTERY OF NATIVE HEART WITHOUT ANGINA PECTORIS: Chronic | ICD-10-CM

## 2021-01-04 DIAGNOSIS — K21.9 GASTROESOPHAGEAL REFLUX DISEASE WITHOUT ESOPHAGITIS: ICD-10-CM

## 2021-01-04 DIAGNOSIS — I10 ESSENTIAL HYPERTENSION: Chronic | ICD-10-CM

## 2021-01-04 DIAGNOSIS — E11.9 TYPE 2 DIABETES MELLITUS WITHOUT RETINOPATHY (HCC): ICD-10-CM

## 2021-01-04 DIAGNOSIS — E78.2 MIXED HYPERLIPIDEMIA: ICD-10-CM

## 2021-01-04 RX ORDER — METOPROLOL SUCCINATE 100 MG/1
100 TABLET, EXTENDED RELEASE ORAL DAILY
Qty: 90 TABLET | Refills: 0 | Status: SHIPPED | OUTPATIENT
Start: 2021-01-04 | End: 2021-05-13

## 2021-01-04 NOTE — TELEPHONE ENCOUNTER
•  atorvastatin 40 MG Oral Tab, Take 1 tablet (40 mg total) by mouth nightly., Disp: 90 tablet, Rfl: 3    •  glipiZIDE 10 MG Oral Tab, Take 1 tablet (10 mg total) by mouth 2 (two) times daily before meals. , Disp: 180 tablet, Rfl: 3    •  Omeprazole 40 MG O

## 2021-01-05 RX ORDER — AMLODIPINE BESYLATE 5 MG/1
5 TABLET ORAL DAILY
Qty: 90 TABLET | Refills: 3 | Status: SHIPPED | OUTPATIENT
Start: 2021-01-05 | End: 2021-05-13

## 2021-01-05 RX ORDER — GLIPIZIDE 10 MG/1
10 TABLET ORAL
Qty: 180 TABLET | Refills: 3 | Status: SHIPPED | OUTPATIENT
Start: 2021-01-05 | End: 2021-05-13

## 2021-01-05 RX ORDER — ATORVASTATIN CALCIUM 40 MG/1
40 TABLET, FILM COATED ORAL NIGHTLY
Qty: 90 TABLET | Refills: 3 | Status: SHIPPED | OUTPATIENT
Start: 2021-01-05 | End: 2021-05-13

## 2021-01-05 RX ORDER — OMEPRAZOLE 40 MG/1
40 CAPSULE, DELAYED RELEASE ORAL DAILY
Qty: 90 CAPSULE | Refills: 3 | Status: SHIPPED | OUTPATIENT
Start: 2021-01-05 | End: 2021-12-31

## 2021-02-01 DIAGNOSIS — Z23 NEED FOR VACCINATION: ICD-10-CM

## 2021-02-08 RX ORDER — ASPIRIN 81 MG/1
81 TABLET ORAL DAILY
Qty: 90 TABLET | Refills: 0 | Status: SHIPPED | OUTPATIENT
Start: 2021-02-08 | End: 2021-06-17

## 2021-05-09 DIAGNOSIS — M54.2 NECK PAIN: ICD-10-CM

## 2021-05-10 RX ORDER — TRAMADOL HYDROCHLORIDE 50 MG/1
TABLET ORAL
Qty: 30 TABLET | Refills: 0 | Status: SHIPPED | OUTPATIENT
Start: 2021-05-10 | End: 2021-07-21

## 2021-05-13 ENCOUNTER — OFFICE VISIT (OUTPATIENT)
Dept: FAMILY MEDICINE CLINIC | Facility: CLINIC | Age: 86
End: 2021-05-13
Payer: MEDICAID

## 2021-05-13 VITALS
WEIGHT: 141 LBS | DIASTOLIC BLOOD PRESSURE: 66 MMHG | BODY MASS INDEX: 26.62 KG/M2 | TEMPERATURE: 98 F | SYSTOLIC BLOOD PRESSURE: 134 MMHG | HEIGHT: 61 IN | HEART RATE: 53 BPM

## 2021-05-13 DIAGNOSIS — I10 ESSENTIAL HYPERTENSION: Chronic | ICD-10-CM

## 2021-05-13 DIAGNOSIS — E11.9 TYPE 2 DIABETES MELLITUS WITHOUT RETINOPATHY (HCC): Primary | ICD-10-CM

## 2021-05-13 DIAGNOSIS — I25.10 ATHEROSCLEROSIS OF NATIVE CORONARY ARTERY OF NATIVE HEART WITHOUT ANGINA PECTORIS: Chronic | ICD-10-CM

## 2021-05-13 DIAGNOSIS — E78.2 MIXED HYPERLIPIDEMIA: ICD-10-CM

## 2021-05-13 DIAGNOSIS — R20.2 PARESTHESIA OF FOOT, BILATERAL: ICD-10-CM

## 2021-05-13 PROCEDURE — 99214 OFFICE O/P EST MOD 30 MIN: CPT | Performed by: FAMILY MEDICINE

## 2021-05-13 PROCEDURE — 3075F SYST BP GE 130 - 139MM HG: CPT | Performed by: FAMILY MEDICINE

## 2021-05-13 PROCEDURE — 3008F BODY MASS INDEX DOCD: CPT | Performed by: FAMILY MEDICINE

## 2021-05-13 PROCEDURE — 3078F DIAST BP <80 MM HG: CPT | Performed by: FAMILY MEDICINE

## 2021-05-13 RX ORDER — ATORVASTATIN CALCIUM 40 MG/1
40 TABLET, FILM COATED ORAL NIGHTLY
Qty: 90 TABLET | Refills: 0 | Status: SHIPPED | OUTPATIENT
Start: 2021-05-13

## 2021-05-13 RX ORDER — METOPROLOL SUCCINATE 100 MG/1
100 TABLET, EXTENDED RELEASE ORAL DAILY
Qty: 90 TABLET | Refills: 0 | Status: SHIPPED | OUTPATIENT
Start: 2021-05-13 | End: 2021-07-21

## 2021-05-13 RX ORDER — AMLODIPINE BESYLATE 5 MG/1
5 TABLET ORAL DAILY
Qty: 90 TABLET | Refills: 0 | Status: SHIPPED | OUTPATIENT
Start: 2021-05-13 | End: 2021-11-30

## 2021-05-13 RX ORDER — GLIPIZIDE 10 MG/1
10 TABLET ORAL
Qty: 180 TABLET | Refills: 0 | Status: SHIPPED | OUTPATIENT
Start: 2021-05-13 | End: 2021-10-21

## 2021-05-13 NOTE — PROGRESS NOTES
Sergio Fraser is a 80year old male who presents for a follow up visit. Accompanied by son. HPI:     Patient presents to the office for diabetes follow-up.      Prior HbA1C: 6.7%  Dietary compliance: Fair mainly sedentary  Exercise:   Polyuria/polydi Strip Check glucose daily 100 each 1   • OneTouch Delica Lancets 36O Does not apply Misc 1 each by Does not apply route daily.  100 each 1   • Blood Glucose Monitoring Suppl (ONETOUCH ULTRA 2) w/Device Does not apply Kit Use to test glucose twice daily 1 ki or ST  LUNGS: denies shortness of breath with exertion  CARDIOVASCULAR: denies chest pain on exertion  GI: denies abdominal pain,denies heartburn  : denies nocturia or changes in stream  MUSCULOSKELETAL: denies back pain  NEURO: denies headaches    EXAM:

## 2021-05-15 ENCOUNTER — LAB ENCOUNTER (OUTPATIENT)
Dept: LAB | Age: 86
End: 2021-05-15
Attending: FAMILY MEDICINE
Payer: MEDICAID

## 2021-05-15 DIAGNOSIS — E11.9 TYPE 2 DIABETES MELLITUS WITHOUT RETINOPATHY (HCC): ICD-10-CM

## 2021-05-15 DIAGNOSIS — R20.2 PARESTHESIA OF FOOT, BILATERAL: ICD-10-CM

## 2021-05-15 PROCEDURE — 82306 VITAMIN D 25 HYDROXY: CPT

## 2021-05-15 PROCEDURE — 36415 COLL VENOUS BLD VENIPUNCTURE: CPT

## 2021-05-15 PROCEDURE — 83036 HEMOGLOBIN GLYCOSYLATED A1C: CPT

## 2021-05-15 PROCEDURE — 82570 ASSAY OF URINE CREATININE: CPT

## 2021-05-15 PROCEDURE — 80061 LIPID PANEL: CPT

## 2021-05-15 PROCEDURE — 82043 UR ALBUMIN QUANTITATIVE: CPT

## 2021-05-15 PROCEDURE — 80053 COMPREHEN METABOLIC PANEL: CPT

## 2021-05-15 PROCEDURE — 82607 VITAMIN B-12: CPT

## 2021-06-16 DIAGNOSIS — I10 ESSENTIAL HYPERTENSION: Chronic | ICD-10-CM

## 2021-06-16 RX ORDER — METOPROLOL SUCCINATE 100 MG/1
100 TABLET, EXTENDED RELEASE ORAL DAILY
Qty: 90 TABLET | Refills: 0 | OUTPATIENT
Start: 2021-06-16

## 2021-06-16 NOTE — TELEPHONE ENCOUNTER
Spoke with Tim Salmon at [facility] patient picked up #90 from 5/13/2021 office visit--she will cancel request.

## 2021-06-18 ENCOUNTER — TELEPHONE (OUTPATIENT)
Dept: CARDIOLOGY CLINIC | Facility: CLINIC | Age: 86
End: 2021-06-18

## 2021-06-18 DIAGNOSIS — I10 ESSENTIAL HYPERTENSION: Primary | ICD-10-CM

## 2021-06-18 NOTE — TELEPHONE ENCOUNTER
Left message with nicolle Christina Mode to call us regarding annual appointment with  and CBC as per our refill protocol for aspirin 81mg

## 2021-06-18 NOTE — TELEPHONE ENCOUNTER
Left message with son Eriberto Gil to call us regarding annual appointment with  and CBC as per our refill protocol,  patient is on ecotrin 81 mg daily.

## 2021-06-21 RX ORDER — ASPIRIN 81 MG/1
81 TABLET ORAL DAILY
Qty: 30 TABLET | Refills: 0 | Status: SHIPPED | OUTPATIENT
Start: 2021-06-21 | End: 2021-08-17

## 2021-06-23 NOTE — TELEPHONE ENCOUNTER
Spoke to patients son (HIPPA verified) informed due for appointment with Dr. Kimberlee Durán and CBC per refill protocol. Verbalized understanding. Appointment scheduled. CBC entered.

## 2021-07-20 DIAGNOSIS — M54.2 NECK PAIN: ICD-10-CM

## 2021-07-20 DIAGNOSIS — I10 ESSENTIAL HYPERTENSION: ICD-10-CM

## 2021-07-21 RX ORDER — ENALAPRIL MALEATE 10 MG/1
TABLET ORAL
Qty: 90 TABLET | Refills: 0 | Status: SHIPPED | OUTPATIENT
Start: 2021-07-21 | End: 2021-10-13

## 2021-07-21 RX ORDER — METOPROLOL SUCCINATE 100 MG/1
100 TABLET, EXTENDED RELEASE ORAL DAILY
Qty: 90 TABLET | Refills: 0 | Status: SHIPPED | OUTPATIENT
Start: 2021-07-21 | End: 2021-10-13

## 2021-07-21 RX ORDER — TRAMADOL HYDROCHLORIDE 50 MG/1
TABLET ORAL
Qty: 30 TABLET | Refills: 0 | Status: SHIPPED | OUTPATIENT
Start: 2021-07-21 | End: 2021-09-14

## 2021-07-22 DIAGNOSIS — M54.2 NECK PAIN: ICD-10-CM

## 2021-07-28 RX ORDER — TRAMADOL HYDROCHLORIDE 50 MG/1
TABLET ORAL
Qty: 30 TABLET | Refills: 0 | OUTPATIENT
Start: 2021-07-28

## 2021-07-28 NOTE — TELEPHONE ENCOUNTER
Language line # [phone] assisted with the phone call. Left message on voicemail. I then called the pharm and verified that the pt did, in fact,  this prescription on 07/22/21. No further action required.

## 2021-08-17 ENCOUNTER — OFFICE VISIT (OUTPATIENT)
Dept: CARDIOLOGY CLINIC | Facility: CLINIC | Age: 86
End: 2021-08-17
Payer: MEDICAID

## 2021-08-17 VITALS
HEIGHT: 62 IN | SYSTOLIC BLOOD PRESSURE: 136 MMHG | BODY MASS INDEX: 26.5 KG/M2 | RESPIRATION RATE: 16 BRPM | WEIGHT: 144 LBS | HEART RATE: 72 BPM | DIASTOLIC BLOOD PRESSURE: 76 MMHG

## 2021-08-17 DIAGNOSIS — I25.10 ATHEROSCLEROSIS OF NATIVE CORONARY ARTERY OF NATIVE HEART WITHOUT ANGINA PECTORIS: ICD-10-CM

## 2021-08-17 DIAGNOSIS — R60.0 LEG EDEMA: ICD-10-CM

## 2021-08-17 DIAGNOSIS — E78.2 MIXED HYPERLIPIDEMIA: ICD-10-CM

## 2021-08-17 DIAGNOSIS — I10 ESSENTIAL HYPERTENSION: Primary | ICD-10-CM

## 2021-08-17 PROCEDURE — 3078F DIAST BP <80 MM HG: CPT | Performed by: INTERNAL MEDICINE

## 2021-08-17 PROCEDURE — 3075F SYST BP GE 130 - 139MM HG: CPT | Performed by: INTERNAL MEDICINE

## 2021-08-17 PROCEDURE — 99214 OFFICE O/P EST MOD 30 MIN: CPT | Performed by: INTERNAL MEDICINE

## 2021-08-17 PROCEDURE — 3008F BODY MASS INDEX DOCD: CPT | Performed by: INTERNAL MEDICINE

## 2021-08-17 RX ORDER — ASPIRIN 81 MG/1
81 TABLET ORAL DAILY
Qty: 90 TABLET | Refills: 3 | Status: SHIPPED | OUTPATIENT
Start: 2021-08-17

## 2021-08-17 NOTE — PATIENT INSTRUCTIONS
Schedule echocardiogram to be done soon    To help with leg swelling keep feet elevated, avoid salt, use support stockings,walk when able    Drink plenty of fluids to stay hydrated    If feeling dizzy or lightheaded check your blood pressure at home

## 2021-08-17 NOTE — PROGRESS NOTES
[facility] CLINIC  PROGRESS NOTE    Cornelia Reardon is a 80year old male. Patient presents with:  Hypertension    HPI:   This is a pleasant 80year old male with coronary disease prior MI and stent known hypertension elevated cholesterol [facility] 1 kit 0   • Glucose Blood (ONETOUCH ULTRA BLUE) In Vitro Strip Use to test glucose twice daily 200 strip 11   • Blood Glucose Calibration (ONETOUCH ULTRA CONTROL) In Vitro Solution Use to test glucose twice daily 2 vial 2   • ONETOUCH ULTRASOFT AMEENA 72   Resp 16   Ht 5' 2\" (1.575 m)   Wt 144 lb (65.3 kg)   BMI 26.34 kg/m²   GENERAL: well developed, well nourished,in no apparent distress  SKIN: no rashes,no suspicious lesions  HEENT: atraumatic, normocephalic,ears and throat are clear  NECK: supple,no

## 2021-09-04 ENCOUNTER — HOSPITAL ENCOUNTER (OUTPATIENT)
Dept: CV DIAGNOSTICS | Facility: HOSPITAL | Age: 86
Discharge: HOME | End: 2021-09-04
Attending: INTERNAL MEDICINE
Payer: MEDICAID

## 2021-09-04 DIAGNOSIS — I25.10 ATHEROSCLEROSIS OF NATIVE CORONARY ARTERY OF NATIVE HEART WITHOUT ANGINA PECTORIS: ICD-10-CM

## 2021-09-04 DIAGNOSIS — I10 ESSENTIAL HYPERTENSION: ICD-10-CM

## 2021-09-04 DIAGNOSIS — E78.2 MIXED HYPERLIPIDEMIA: ICD-10-CM

## 2021-09-04 DIAGNOSIS — R60.0 LEG EDEMA: ICD-10-CM

## 2021-09-04 PROCEDURE — 93306 TTE W/DOPPLER COMPLETE: CPT | Performed by: INTERNAL MEDICINE

## 2021-09-08 ENCOUNTER — TELEPHONE (OUTPATIENT)
Dept: CARDIOLOGY CLINIC | Facility: CLINIC | Age: 86
End: 2021-09-08

## 2021-09-08 NOTE — TELEPHONE ENCOUNTER
Chano Villasenor called back, echo results reviewed with him, questions answered, sent link for [employer] to activate

## 2021-09-08 NOTE — TELEPHONE ENCOUNTER
Left message to call back. Red Dickerson, APRN   9/8/2021  9:40 AM CDT       Results reviewed and are stable, continue present management.  Echo shows some improvement in LV

## 2021-09-08 NOTE — TELEPHONE ENCOUNTER
Left message for son Ricky Ramirez to call the office regarding results. Result Care Coordination      Result Notes     Elaine Montanez   9/8/2021 11:23 AM CDT Back to Top      Left message on sons' [phone] voice mail to call us back regarding results.

## 2021-09-12 DIAGNOSIS — M54.2 NECK PAIN: ICD-10-CM

## 2021-09-14 RX ORDER — TRAMADOL HYDROCHLORIDE 50 MG/1
TABLET ORAL
Qty: 30 TABLET | Refills: 0 | Status: SHIPPED | OUTPATIENT
Start: 2021-09-14 | End: 2021-10-13

## 2021-10-12 DIAGNOSIS — I10 ESSENTIAL HYPERTENSION: ICD-10-CM

## 2021-10-12 DIAGNOSIS — M54.2 NECK PAIN: ICD-10-CM

## 2021-10-13 RX ORDER — ENALAPRIL MALEATE 10 MG/1
10 TABLET ORAL DAILY
Qty: 90 TABLET | Refills: 1 | Status: SHIPPED | OUTPATIENT
Start: 2021-10-13

## 2021-10-13 RX ORDER — TRAMADOL HYDROCHLORIDE 50 MG/1
TABLET ORAL
Qty: 30 TABLET | Refills: 0 | Status: SHIPPED | OUTPATIENT
Start: 2021-10-13 | End: 2022-01-04

## 2021-10-13 RX ORDER — METOPROLOL SUCCINATE 100 MG/1
100 TABLET, EXTENDED RELEASE ORAL DAILY
Qty: 90 TABLET | Refills: 1 | Status: SHIPPED | OUTPATIENT
Start: 2021-10-13

## 2021-10-13 NOTE — TELEPHONE ENCOUNTER
Please review. Protocol failed / No protocol.     Requested Prescriptions   Pending Prescriptions Disp Refills    TRAMADOL 50 MG Oral Tab [Pharmacy Med Name: Tramadol Hcl 50 Mg Tab Sunp] 30 tablet 0     Sig: Take 1 tablet by mouth every 6 hours as needed f Radha España, DO    Office Visit

## 2021-10-13 NOTE — TELEPHONE ENCOUNTER
Refill passed per [address] [place] protocol.     Requested Prescriptions   Pending Prescriptions Disp Refills    METOPROLOL SUCCINATE 100 MG Oral Tablet 24 Hr [Pharmacy Med Name: Metoprolol Succinate Er 24hr 100 Mg Tab Nort] 90 tablet 0     Sig: Take 1 table Essential hypertension    [address], [address], DO    Office Visit

## 2021-10-14 NOTE — TELEPHONE ENCOUNTER
Please help patient schedule diabetes follow-up. His cholesterol and diabetes medications have not been refilled since May 2021.

## 2021-10-21 ENCOUNTER — VIRTUAL PHONE E/M (OUTPATIENT)
Dept: FAMILY MEDICINE CLINIC | Facility: CLINIC | Age: 86
End: 2021-10-21
Payer: MEDICAID

## 2021-10-21 DIAGNOSIS — R42 DIZZINESS: ICD-10-CM

## 2021-10-21 DIAGNOSIS — E11.9 TYPE 2 DIABETES MELLITUS WITHOUT RETINOPATHY (HCC): Primary | ICD-10-CM

## 2021-10-21 PROCEDURE — 99213 OFFICE O/P EST LOW 20 MIN: CPT | Performed by: FAMILY MEDICINE

## 2021-10-21 NOTE — PROGRESS NOTES
Virtual Telephone Check-In    Kimberly Magaña verbally consents to a Virtual/Telephone Check-In service on 10/21/21. Patient understands and accepts financial responsibility for any deductible, co-insurance and/or co-pays associated with this service.

## 2021-11-29 DIAGNOSIS — E11.9 TYPE 2 DIABETES MELLITUS WITHOUT RETINOPATHY (HCC): ICD-10-CM

## 2021-11-29 DIAGNOSIS — I10 ESSENTIAL HYPERTENSION: Chronic | ICD-10-CM

## 2021-11-30 RX ORDER — SITAGLIPTIN 100 MG/1
TABLET, FILM COATED ORAL
Qty: 90 TABLET | Refills: 0 | Status: SHIPPED | OUTPATIENT
Start: 2021-11-30

## 2021-11-30 RX ORDER — AMLODIPINE BESYLATE 5 MG/1
5 TABLET ORAL DAILY
Qty: 90 TABLET | Refills: 0 | Status: SHIPPED | OUTPATIENT
Start: 2021-11-30 | End: 2022-11-25

## 2022-01-03 DIAGNOSIS — M54.2 NECK PAIN: ICD-10-CM

## 2022-01-04 RX ORDER — TRAMADOL HYDROCHLORIDE 50 MG/1
TABLET ORAL
Qty: 30 TABLET | Refills: 0 | Status: SHIPPED | OUTPATIENT
Start: 2022-01-04

## 2022-02-22 RX ORDER — TRAMADOL HYDROCHLORIDE 50 MG/1
50 TABLET ORAL EVERY 6 HOURS PRN
Qty: 30 TABLET | Refills: 0 | Status: SHIPPED | OUTPATIENT
Start: 2022-02-22 | End: 2022-04-03

## 2022-02-22 NOTE — TELEPHONE ENCOUNTER
Please review; protocol failed/no protocol    Requested Prescriptions   Pending Prescriptions Disp Refills    TRAMADOL 50 MG Oral Tab [Pharmacy Med Name: Tramadol Hcl 50 Mg Tab Sunp] 30 tablet 0     Sig: Take 1 tablet by mouth every 6 hours as needed for Pain.         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

## 2022-04-03 RX ORDER — METOPROLOL SUCCINATE 100 MG/1
100 TABLET, EXTENDED RELEASE ORAL DAILY
Qty: 90 TABLET | Refills: 1 | Status: SHIPPED | OUTPATIENT
Start: 2022-04-03

## 2022-04-03 RX ORDER — AMLODIPINE BESYLATE 5 MG/1
5 TABLET ORAL DAILY
Qty: 90 TABLET | Refills: 1 | Status: SHIPPED | OUTPATIENT
Start: 2022-04-03

## 2022-04-03 RX ORDER — TRAMADOL HYDROCHLORIDE 50 MG/1
50 TABLET ORAL EVERY 6 HOURS PRN
Qty: 30 TABLET | Refills: 5 | Status: SHIPPED | OUTPATIENT
Start: 2022-04-03

## 2022-04-03 NOTE — TELEPHONE ENCOUNTER
Please review refill failed/no protocol     Requested Prescriptions     Pending Prescriptions Disp Refills    METOPROLOL SUCCINATE  MG Oral Tablet 24 Hr [Pharmacy Med Name: Metoprolol Succinate Er 24hr 100 Mg Tab Nort] 90 tablet 0     Sig: Take 1 tablet (100 mg total) by mouth daily. AMLODIPINE 5 MG Oral Tab [Pharmacy Med Name: Amlodipine Besylate 5 Mg Tab Unic] 90 tablet 0     Sig: Take 1 tablet (5 mg total) by mouth daily. TRAMADOL 50 MG Oral Tab [Pharmacy Med Name: Tramadol Hcl 50 Mg Tab Sunp] 30 tablet 0     Sig: Take 1 tablet (50 mg total) by mouth every 6 (six) hours as needed for Pain. Recent Visits  Date Type Provider Dept   05[facility]13/21 [facility]   [facility]   Showing recent visits within past 540 days with a meds authorizing provider and meeting all other requirements  Future Appointments  No visits were found meeting these conditions. Showing future appointments within next 150 days with a meds authorizing provider and meeting all other requirements    Requested Prescriptions   Pending Prescriptions Disp Refills    METOPROLOL SUCCINATE  MG Oral Tablet 24 Hr [Pharmacy Med Name: Metoprolol Succinate Er 24hr 100 Mg Tab Nort] 90 tablet 0     Sig: Take 1 tablet (100 mg total) by mouth daily. Hypertensive Medications Protocol Failed - 3/31/2022  7:30 PM        Failed - Appointment in past 6 or next 3 months        Passed - CMP or BMP in past 12 months        Passed - GFR Non- > 50     Lab Results   Component Value Date    GFRNAA 80 05/15/2021                    AMLODIPINE 5 MG Oral Tab [Pharmacy Med Name: Amlodipine Besylate 5 Mg Tab Unic] 90 tablet 0     Sig: Take 1 tablet (5 mg total) by mouth daily.         Hypertensive Medications Protocol Failed - 3/31/2022  7:30 PM        Failed - Appointment in past 6 or next 3 months        Passed - CMP or BMP in past 12 months Passed - GFR Non-African American > 50     Lab Results   Component Value Date    GFRNAA 80 05/15/2021                    TRAMADOL 50 MG Oral Tab [Pharmacy Med Name: Tramadol Hcl 50 Mg Tab Sunp] 30 tablet 0     Sig: Take 1 tablet (50 mg total) by mouth every 6 (six) hours as needed for Pain.         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

## 2022-04-25 ENCOUNTER — APPOINTMENT (OUTPATIENT)
Dept: CT IMAGING | Facility: HOSPITAL | Age: 87
End: 2022-04-25
Attending: EMERGENCY MEDICINE
Payer: MEDICAID

## 2022-04-25 ENCOUNTER — NURSE TRIAGE (OUTPATIENT)
Dept: FAMILY MEDICINE CLINIC | Facility: CLINIC | Age: 87
End: 2022-04-25

## 2022-04-25 ENCOUNTER — APPOINTMENT (OUTPATIENT)
Dept: GENERAL RADIOLOGY | Facility: HOSPITAL | Age: 87
End: 2022-04-25
Attending: EMERGENCY MEDICINE
Payer: MEDICAID

## 2022-04-25 ENCOUNTER — HOSPITAL ENCOUNTER (EMERGENCY)
Facility: HOSPITAL | Age: 87
Discharge: HOME | End: 2022-04-25
Attending: EMERGENCY MEDICINE
Payer: MEDICAID

## 2022-04-25 VITALS
SYSTOLIC BLOOD PRESSURE: 115 MMHG | DIASTOLIC BLOOD PRESSURE: 45 MMHG | RESPIRATION RATE: 18 BRPM | OXYGEN SATURATION: 96 % | HEART RATE: 52 BPM | TEMPERATURE: 98 F

## 2022-04-25 DIAGNOSIS — J18.9 COMMUNITY ACQUIRED PNEUMONIA OF LEFT LOWER LOBE OF LUNG: ICD-10-CM

## 2022-04-25 DIAGNOSIS — U07.1 COVID-19: Primary | ICD-10-CM

## 2022-04-25 LAB
ALBUMIN SERPL-MCNC: 3.4 G/DL (ref 3.4–5)
ALBUMIN/GLOB SERPL: 0.9 {RATIO} (ref 1–2)
ALP LIVER SERPL-CCNC: 84 U/L
ALT SERPL-CCNC: 29 U/L
ANION GAP SERPL CALC-SCNC: 7 MMOL/L (ref 0–18)
AST SERPL-CCNC: 46 U/L (ref 15–37)
BASOPHILS # BLD AUTO: 0.03 X10(3) UL (ref 0–0.2)
BASOPHILS NFR BLD AUTO: 0.3 %
BILIRUB SERPL-MCNC: 1.7 MG/DL (ref 0.1–2)
BILIRUB UR QL: NEGATIVE
BUN BLD-MCNC: 19 MG/DL (ref 7–18)
BUN/CREAT SERPL: 20.7 (ref 10–20)
CALCIUM BLD-MCNC: 8.5 MG/DL (ref 8.5–10.1)
CHLORIDE SERPL-SCNC: 101 MMOL/L (ref 98–112)
CLARITY UR: CLEAR
CO2 SERPL-SCNC: 27 MMOL/L (ref 21–32)
COLOR UR: YELLOW
CREAT BLD-MCNC: 0.92 MG/DL
DEPRECATED RDW RBC AUTO: 43.6 FL (ref 35.1–46.3)
EOSINOPHIL # BLD AUTO: 0 X10(3) UL (ref 0–0.7)
EOSINOPHIL NFR BLD AUTO: 0 %
ERYTHROCYTE [DISTWIDTH] IN BLOOD BY AUTOMATED COUNT: 13.2 % (ref 11–15)
GLOBULIN PLAS-MCNC: 3.6 G/DL (ref 2.8–4.4)
GLUCOSE BLD-MCNC: 138 MG/DL (ref 70–99)
GLUCOSE UR-MCNC: 50 MG/DL
HCT VFR BLD AUTO: 39 %
HGB BLD-MCNC: 13.2 G/DL
IMM GRANULOCYTES # BLD AUTO: 0.04 X10(3) UL (ref 0–1)
IMM GRANULOCYTES NFR BLD: 0.4 %
KETONES UR-MCNC: NEGATIVE MG/DL
LACTATE SERPL-SCNC: 1.8 MMOL/L (ref 0.4–2)
LEUKOCYTE ESTERASE UR QL STRIP.AUTO: NEGATIVE
LYMPHOCYTES # BLD AUTO: 2.15 X10(3) UL (ref 1–4)
LYMPHOCYTES NFR BLD AUTO: 21.5 %
MCH RBC QN AUTO: 30.3 PG (ref 26–34)
MCHC RBC AUTO-ENTMCNC: 33.8 G/DL (ref 31–37)
MCV RBC AUTO: 89.7 FL
MONOCYTES # BLD AUTO: 0.93 X10(3) UL (ref 0.1–1)
MONOCYTES NFR BLD AUTO: 9.3 %
NEUTROPHILS # BLD AUTO: 6.86 X10 (3) UL (ref 1.5–7.7)
NEUTROPHILS # BLD AUTO: 6.86 X10(3) UL (ref 1.5–7.7)
NEUTROPHILS NFR BLD AUTO: 68.5 %
NITRITE UR QL STRIP.AUTO: NEGATIVE
OSMOLALITY SERPL CALC.SUM OF ELEC: 284 MOSM/KG (ref 275–295)
PH UR: 5 [PH] (ref 5–8)
PLATELET # BLD AUTO: 111 10(3)UL (ref 150–450)
POTASSIUM SERPL-SCNC: 3.4 MMOL/L (ref 3.5–5.1)
PROT SERPL-MCNC: 7 G/DL (ref 6.4–8.2)
PROT UR-MCNC: 30 MG/DL
RBC # BLD AUTO: 4.35 X10(6)UL
SARS-COV-2 RNA RESP QL NAA+PROBE: DETECTED
SODIUM SERPL-SCNC: 135 MMOL/L (ref 136–145)
SP GR UR STRIP: 1.02 (ref 1–1.03)
UROBILINOGEN UR STRIP-ACNC: 2
VIT C UR-MCNC: NEGATIVE MG/DL
WBC # BLD AUTO: 10 X10(3) UL (ref 4–11)

## 2022-04-25 PROCEDURE — 70450 CT HEAD/BRAIN W/O DYE: CPT | Performed by: EMERGENCY MEDICINE

## 2022-04-25 PROCEDURE — 93010 ELECTROCARDIOGRAM REPORT: CPT | Performed by: EMERGENCY MEDICINE

## 2022-04-25 PROCEDURE — 80053 COMPREHEN METABOLIC PANEL: CPT | Performed by: EMERGENCY MEDICINE

## 2022-04-25 PROCEDURE — 71045 X-RAY EXAM CHEST 1 VIEW: CPT | Performed by: EMERGENCY MEDICINE

## 2022-04-25 PROCEDURE — 81001 URINALYSIS AUTO W/SCOPE: CPT | Performed by: EMERGENCY MEDICINE

## 2022-04-25 PROCEDURE — 85025 COMPLETE CBC W/AUTO DIFF WBC: CPT | Performed by: EMERGENCY MEDICINE

## 2022-04-25 PROCEDURE — 99285 EMERGENCY DEPT VISIT HI MDM: CPT

## 2022-04-25 PROCEDURE — 83605 ASSAY OF LACTIC ACID: CPT | Performed by: EMERGENCY MEDICINE

## 2022-04-25 PROCEDURE — 93005 ELECTROCARDIOGRAM TRACING: CPT

## 2022-04-25 RX ORDER — DOXYCYCLINE HYCLATE 100 MG/1
100 CAPSULE ORAL 2 TIMES DAILY
Qty: 20 CAPSULE | Refills: 0 | Status: SHIPPED | OUTPATIENT
Start: 2022-04-25 | End: 2022-05-05

## 2022-04-25 RX ORDER — ACETAMINOPHEN 500 MG
1000 TABLET ORAL ONCE
Status: COMPLETED | OUTPATIENT
Start: 2022-04-25 | End: 2022-04-25

## 2022-04-25 RX ORDER — BEBTELOVIMAB 87.5 MG/ML
175 INJECTION, SOLUTION INTRAVENOUS ONCE
Status: COMPLETED | OUTPATIENT
Start: 2022-04-25 | End: 2022-04-25

## 2022-04-25 NOTE — ED QUICK NOTES
Discharge summary reviewed including medication administration and follow up appointments. Advised to return to the Emergency Department with new or worsening symptoms. Patient verbalized understanding. All questions answered at this time.  Discharged home with family

## 2022-04-25 NOTE — ED INITIAL ASSESSMENT (HPI)
Patient to ed with family co of unwitnessed mech fall off bed x 1 hour pta. Per family stated patient had foul smelling urine.  Incidental finding of fever in triage

## 2022-04-26 ENCOUNTER — TELEPHONE (OUTPATIENT)
Dept: CASE MANAGEMENT | Age: 87
End: 2022-04-26

## 2022-04-26 NOTE — TELEPHONE ENCOUNTER
Per chart review, message below is incorrect. Patient received MAB at [address] [place] [address] ER on 4/25/22. Post- MAB Monitoring Day 1of 1. Spoke with patient's son Sejal Nieves (NICHOLAS on file) who was with the patient:  What  was your temp today? 97.8 F    How did you take your temp?     with an oral thermometer    What was your pulse ox today?  98%    Are you feeling short of breath today? No      Is the shortness of breath better, the same, or worse than yesterday? Better     Are you having a cough today? Yes    Is the cough better, the same, or worse than yesterday?    about the same    Are you experiencing weakness today? Yes, \"just a little bit\"    Is the weakness better, the same or worse than yesterday? better    How is your appetite compared to yesterday?     about the same    Are you vomiting? No    Are you experiencing diarrhea? No    Any loss of taste or smell? No      Nursing notes: Patient denies other symptoms other than mild body aches still; tates feels better today than yesterday. Reviewed with patient's son for patient to new if develops any new/worsening symptoms but ER if develops any SOB while at rest, or any moderate-severe SOB/difficulty breathing (advised 911 if severe), chest pain, or consistently low pulse ox readings; and son voiced understanding and agrees.

## 2022-04-26 NOTE — TELEPHONE ENCOUNTER
Pt received MAB infusion at [address] [place] [address] on 4-26-22 for COVID-19. Please follow-up with pt for post-infusion assessment and home monitoring if needed. Thank you.

## 2022-04-26 NOTE — TELEPHONE ENCOUNTER
Patient +Covid 4/25, received MAB infusion. See Home Monitoring encounter. Patient has appt scheduled 5/2.

## 2022-04-30 DIAGNOSIS — K21.9 GASTROESOPHAGEAL REFLUX DISEASE WITHOUT ESOPHAGITIS: ICD-10-CM

## 2022-04-30 RX ORDER — OMEPRAZOLE 40 MG/1
40 CAPSULE, DELAYED RELEASE ORAL DAILY
Qty: 90 CAPSULE | Refills: 1 | Status: SHIPPED | OUTPATIENT
Start: 2022-04-30 | End: 2022-12-05

## 2022-04-30 NOTE — TELEPHONE ENCOUNTER
Refill passed per [facility] protocol. Requested Prescriptions   Pending Prescriptions Disp Refills    OMEPRAZOLE 40 MG Oral Capsule Delayed Release [Pharmacy Med Name: Omeprazole Dr 40 Mg Cap Nort] 90 capsule 0     Sig: Take 1 capsule (40 mg total) by mouth daily.         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

## 2022-05-09 ENCOUNTER — OFFICE VISIT (OUTPATIENT)
Dept: FAMILY MEDICINE CLINIC | Facility: CLINIC | Age: 87
End: 2022-05-09
Payer: MEDICAID

## 2022-05-09 VITALS
HEART RATE: 65 BPM | WEIGHT: 137 LBS | SYSTOLIC BLOOD PRESSURE: 138 MMHG | TEMPERATURE: 98 F | BODY MASS INDEX: 25.21 KG/M2 | HEIGHT: 62 IN | DIASTOLIC BLOOD PRESSURE: 74 MMHG

## 2022-05-09 DIAGNOSIS — I25.10 ATHEROSCLEROSIS OF NATIVE CORONARY ARTERY OF NATIVE HEART WITHOUT ANGINA PECTORIS: Chronic | ICD-10-CM

## 2022-05-09 DIAGNOSIS — I10 ESSENTIAL HYPERTENSION: ICD-10-CM

## 2022-05-09 DIAGNOSIS — E78.2 MIXED HYPERLIPIDEMIA: ICD-10-CM

## 2022-05-09 DIAGNOSIS — I48.91 ATRIAL FIBRILLATION, UNSPECIFIED TYPE (HCC): Primary | ICD-10-CM

## 2022-05-09 DIAGNOSIS — B35.1 ONYCHOMYCOSIS: ICD-10-CM

## 2022-05-09 DIAGNOSIS — E11.9 TYPE 2 DIABETES MELLITUS WITHOUT RETINOPATHY (HCC): ICD-10-CM

## 2022-05-09 PROCEDURE — 3075F SYST BP GE 130 - 139MM HG: CPT | Performed by: FAMILY MEDICINE

## 2022-05-09 PROCEDURE — 99214 OFFICE O/P EST MOD 30 MIN: CPT | Performed by: FAMILY MEDICINE

## 2022-05-09 PROCEDURE — 3078F DIAST BP <80 MM HG: CPT | Performed by: FAMILY MEDICINE

## 2022-05-09 PROCEDURE — 3008F BODY MASS INDEX DOCD: CPT | Performed by: FAMILY MEDICINE

## 2022-05-09 RX ORDER — METOPROLOL SUCCINATE 100 MG/1
100 TABLET, EXTENDED RELEASE ORAL DAILY
Qty: 90 TABLET | Refills: 1 | Status: SHIPPED | OUTPATIENT
Start: 2022-06-30

## 2022-05-09 RX ORDER — AMLODIPINE BESYLATE 5 MG/1
5 TABLET ORAL DAILY
Qty: 90 TABLET | Refills: 1 | Status: SHIPPED | OUTPATIENT
Start: 2022-06-30

## 2022-05-09 RX ORDER — ATORVASTATIN CALCIUM 40 MG/1
40 TABLET, FILM COATED ORAL NIGHTLY
Qty: 90 TABLET | Refills: 0 | Status: SHIPPED | OUTPATIENT
Start: 2022-05-09

## 2022-05-09 RX ORDER — ENALAPRIL MALEATE 10 MG/1
10 TABLET ORAL DAILY
Qty: 90 TABLET | Refills: 1 | Status: SHIPPED | OUTPATIENT
Start: 2022-05-09

## 2022-05-10 ENCOUNTER — TELEPHONE (OUTPATIENT)
Dept: FAMILY MEDICINE CLINIC | Facility: CLINIC | Age: 87
End: 2022-05-10

## 2022-05-10 NOTE — TELEPHONE ENCOUNTER
Received call from [facility] requesting chart notes and any labs for a PA on Ciclopirox 8 % External Solution.     Reference #[id number]    Printed chart notes from 05/09/2022 visit and faxed to [phone]

## 2022-05-14 ENCOUNTER — LAB ENCOUNTER (OUTPATIENT)
Dept: LAB | Age: 87
End: 2022-05-14
Attending: FAMILY MEDICINE
Payer: MEDICAID

## 2022-05-14 LAB
ALBUMIN SERPL-MCNC: 3.6 G/DL (ref 3.4–5)
ALBUMIN/GLOB SERPL: 1 {RATIO} (ref 1–2)
ALP LIVER SERPL-CCNC: 96 U/L
ALT SERPL-CCNC: 24 U/L
ANION GAP SERPL CALC-SCNC: 6 MMOL/L (ref 0–18)
AST SERPL-CCNC: 22 U/L (ref 15–37)
BILIRUB SERPL-MCNC: 1.4 MG/DL (ref 0.1–2)
BUN BLD-MCNC: 14 MG/DL (ref 7–18)
BUN/CREAT SERPL: 14.6 (ref 10–20)
CALCIUM BLD-MCNC: 9.5 MG/DL (ref 8.5–10.1)
CHLORIDE SERPL-SCNC: 109 MMOL/L (ref 98–112)
CHOLEST SERPL-MCNC: 109 MG/DL (ref ?–200)
CO2 SERPL-SCNC: 30 MMOL/L (ref 21–32)
CREAT BLD-MCNC: 0.96 MG/DL
EST. AVERAGE GLUCOSE BLD GHB EST-MCNC: 160 MG/DL (ref 68–126)
FASTING PATIENT LIPID ANSWER: YES
FASTING STATUS PATIENT QL REPORTED: YES
GLOBULIN PLAS-MCNC: 3.5 G/DL (ref 2.8–4.4)
GLUCOSE BLD-MCNC: 142 MG/DL (ref 70–99)
HBA1C MFR BLD: 7.2 % (ref ?–5.7)
HDLC SERPL-MCNC: 44 MG/DL (ref 40–59)
LDLC SERPL CALC-MCNC: 41 MG/DL (ref ?–100)
NONHDLC SERPL-MCNC: 65 MG/DL (ref ?–130)
OSMOLALITY SERPL CALC.SUM OF ELEC: 303 MOSM/KG (ref 275–295)
POTASSIUM SERPL-SCNC: 4.5 MMOL/L (ref 3.5–5.1)
PROT SERPL-MCNC: 7.1 G/DL (ref 6.4–8.2)
SODIUM SERPL-SCNC: 145 MMOL/L (ref 136–145)
TRIGL SERPL-MCNC: 137 MG/DL (ref 30–149)
VLDLC SERPL CALC-MCNC: 19 MG/DL (ref 0–30)

## 2022-05-14 PROCEDURE — 36415 COLL VENOUS BLD VENIPUNCTURE: CPT | Performed by: FAMILY MEDICINE

## 2022-05-14 PROCEDURE — 80053 COMPREHEN METABOLIC PANEL: CPT | Performed by: FAMILY MEDICINE

## 2022-05-14 PROCEDURE — 80061 LIPID PANEL: CPT | Performed by: FAMILY MEDICINE

## 2022-05-14 PROCEDURE — 83036 HEMOGLOBIN GLYCOSYLATED A1C: CPT | Performed by: FAMILY MEDICINE

## 2022-07-19 RX ORDER — BLOOD SUGAR DIAGNOSTIC
STRIP MISCELLANEOUS
Qty: 100 STRIP | Refills: 0 | Status: SHIPPED | OUTPATIENT
Start: 2022-07-19

## 2022-07-23 DIAGNOSIS — I25.10 ATHEROSCLEROSIS OF NATIVE CORONARY ARTERY OF NATIVE HEART WITHOUT ANGINA PECTORIS: Chronic | ICD-10-CM

## 2022-07-23 DIAGNOSIS — E78.2 MIXED HYPERLIPIDEMIA: ICD-10-CM

## 2022-07-25 RX ORDER — ATORVASTATIN CALCIUM 40 MG/1
40 TABLET, FILM COATED ORAL NIGHTLY
Qty: 90 TABLET | Refills: 0 | Status: SHIPPED | OUTPATIENT
Start: 2022-07-25

## 2022-08-08 ENCOUNTER — TELEPHONE (OUTPATIENT)
Dept: FAMILY MEDICINE CLINIC | Facility: CLINIC | Age: 87
End: 2022-08-08

## 2022-08-08 NOTE — TELEPHONE ENCOUNTER
Patient's son, Janette Moreno, is requesting a letter from PCP to excuse patient from taking the Naturalization written and verbal exam per patient's health reasons and unable to read or write. Patient's son will also be dropping off the form at PCP's office on 8/9/22 for completion as this will facilitate the request.      Please advise.

## 2022-08-09 NOTE — TELEPHONE ENCOUNTER
Can drop form off for pt and his wife  - if I have q's after seeing forms may need to set up phone visit appt.

## 2022-08-19 NOTE — TELEPHONE ENCOUNTER
Left message for patient's son Guillermina Isaac to call back. Need to schedule video visit for patient and his wife Daisy Holter in regards to forms below.

## 2022-08-20 NOTE — TELEPHONE ENCOUNTER
Please help patient's son Earnestine Lutz make a virtual visit for this patient. Need to discuss the form he dropped off. Will send a separate encounter regarding patient's wife who also needs a virtual visit. Can fit them both into diff 15-minute slots on the same day as a virtual visit and I will see them both at the same time.

## 2022-09-03 ENCOUNTER — OFFICE VISIT (OUTPATIENT)
Dept: FAMILY MEDICINE CLINIC | Facility: CLINIC | Age: 87
End: 2022-09-03
Payer: MEDICAID

## 2022-09-03 VITALS
HEIGHT: 62 IN | BODY MASS INDEX: 25.58 KG/M2 | DIASTOLIC BLOOD PRESSURE: 65 MMHG | SYSTOLIC BLOOD PRESSURE: 138 MMHG | TEMPERATURE: 98 F | WEIGHT: 139 LBS | HEART RATE: 54 BPM

## 2022-09-03 DIAGNOSIS — E11.9 TYPE 2 DIABETES MELLITUS WITHOUT RETINOPATHY (HCC): Primary | ICD-10-CM

## 2022-09-03 DIAGNOSIS — R42 DIZZINESS: ICD-10-CM

## 2022-09-03 LAB
CARTRIDGE LOT#: ABNORMAL NUMERIC
HEMOGLOBIN A1C: 7.5 % (ref 4.3–5.6)

## 2022-09-03 PROCEDURE — 3008F BODY MASS INDEX DOCD: CPT | Performed by: FAMILY MEDICINE

## 2022-09-03 PROCEDURE — 99213 OFFICE O/P EST LOW 20 MIN: CPT | Performed by: FAMILY MEDICINE

## 2022-09-03 PROCEDURE — 3075F SYST BP GE 130 - 139MM HG: CPT | Performed by: FAMILY MEDICINE

## 2022-09-03 PROCEDURE — 3078F DIAST BP <80 MM HG: CPT | Performed by: FAMILY MEDICINE

## 2022-09-03 PROCEDURE — 83036 HEMOGLOBIN GLYCOSYLATED A1C: CPT | Performed by: FAMILY MEDICINE

## 2022-09-03 RX ORDER — MECLIZINE HYDROCHLORIDE 25 MG/1
25 TABLET ORAL 2 TIMES DAILY PRN
Qty: 30 TABLET | Refills: 3 | Status: SHIPPED | OUTPATIENT
Start: 2022-09-03

## 2022-09-03 RX ORDER — NEOMYCIN SULFATE, POLYMYXIN B SULFATE AND DEXAMETHASONE 3.5; 10000; 1 MG/ML; [USP'U]/ML; MG/ML
SUSPENSION/ DROPS OPHTHALMIC
COMMUNITY
Start: 2022-07-08

## 2022-09-03 NOTE — PATIENT INSTRUCTIONS
Chualar tramadol para farooq teresa solamente. Tylenol o excedrin para farooq de jas leves. si Delio Cavalier me pueden traer las fromas de nuevo para agregar diagnosis de problemas de la memoria - creo que eso calificaria.

## 2022-09-07 ENCOUNTER — TELEPHONE (OUTPATIENT)
Dept: FAMILY MEDICINE CLINIC | Facility: CLINIC | Age: 87
End: 2022-09-07

## 2022-09-07 NOTE — TELEPHONE ENCOUNTER
Patients son came into the clinic and dropped off Immigration forms for the patient for the Provider.

## 2022-09-21 NOTE — TELEPHONE ENCOUNTER
Leland Alvarez, pt's son on the phone will like to receive a call back regarding the forms that were dropped of to the office on 9/7 to be completed by Dr. Wes Montelongo.  Please advise

## 2022-09-29 ENCOUNTER — MED REC SCAN ONLY (OUTPATIENT)
Dept: FAMILY MEDICINE CLINIC | Facility: CLINIC | Age: 87
End: 2022-09-29

## 2022-11-09 DIAGNOSIS — I10 ESSENTIAL HYPERTENSION: ICD-10-CM

## 2022-11-09 RX ORDER — ENALAPRIL MALEATE 10 MG/1
10 TABLET ORAL DAILY
Qty: 90 TABLET | Refills: 1 | Status: SHIPPED | OUTPATIENT
Start: 2022-11-09

## 2022-11-09 NOTE — TELEPHONE ENCOUNTER
Refill passed per [facility] protocol. Requested Prescriptions   Pending Prescriptions Disp Refills    ENALAPRIL 10 MG Oral Tab [Pharmacy Med Name: Enalapril Maleate 10 Mg Tab Nort] 90 tablet 0     Sig: Take 1 tablet (10 mg total) by mouth daily.        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 [facility])    Ramesh Caban, Afia Xavier MD    Virtual Phone E/M    1 year ago Essential hypertension    [facility] Chante Byrd MD    Office Visit    1 year ago 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 [facility])    Ramesh Caban, Chela Arauz, Monet Bravo MD    Office Visit                      Passed - Last BP reading less than 140/90     BP Readings from Last 1 Encounters:  09/03/22 : 138/65              Passed - CMP or BMP in past 6 months     Recent Results (from the past 4392 hour(s))   COMP METABOLIC PANEL (14)    Collection Time: 05/14/22  8:24 AM   Result Value Ref Range    Glucose 142 (H) 70 - 99 mg/dL    Sodium 145 136 - 145 mmol/L    Potassium 4.5 3.5 - 5.1 mmol/L    Chloride 109 98 - 112 mmol/L    CO2 30.0 21.0 - 32.0 mmol/L    Anion Gap 6 0 - 18 mmol/L    BUN 14 7 - 18 mg/dL    Creatinine 0.96 0.70 - 1.30 mg/dL    BUN/CREA Ratio 14.6 10.0 - 20.0    Calcium, Total 9.5 8.5 - 10.1 mg/dL    Calculated Osmolality 303 (H) 275 - 295 mOsm/kg    GFR, Non- 69 >=60    GFR, -American 80 >=60    ALT 24 16 - 61 U/L    AST 22 15 - 37 U/L    Alkaline Phosphatase 96 45 - 117 U/L    Bilirubin, Total 1.4 0.1 - 2.0 mg/dL    Total Protein 7.1 6.4 - 8.2 g/dL    Albumin 3.6 3.4 - 5.0 g/dL    Globulin  3.5 2.8 - 4.4 g/dL    A/G Ratio 1.0 1.0 - 2.0    Patient Fasting for CMP? Yes      *Note: Due to a large number of results and/or encounters for the requested time period, some results have not been displayed. A complete set of results can be found in Results Review.                Passed - In person appointment or virtual visit in the past 6 months     Recent Outpatient Visits              2 months ago 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 [facility])    [address] Consuelo Iron, MD    Office Visit    6 months ago Atrial fibrillation, unspecified type 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)    [address] Consuelo Iron, MD    Office Visit                      Passed - [facility] or GFRNAA > 50     GFR Evaluation  GFRNAA: 71 , resulted on 5/14/2022               Recent Outpatient Visits              2 months ago 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 [facility])    [address] Consuelo Iron, MD    Office Visit    6 months ago Atrial fibrillation, unspecified type 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

## 2022-12-04 DIAGNOSIS — K21.9 GASTROESOPHAGEAL REFLUX DISEASE WITHOUT ESOPHAGITIS: ICD-10-CM

## 2022-12-05 RX ORDER — OMEPRAZOLE 40 MG/1
40 CAPSULE, DELAYED RELEASE ORAL DAILY
Qty: 90 CAPSULE | Refills: 0 | Status: SHIPPED | OUTPATIENT
Start: 2022-12-05

## 2022-12-05 NOTE — TELEPHONE ENCOUNTER
Refill passed per [facility] protocol. Requested Prescriptions   Pending Prescriptions Disp Refills    OMEPRAZOLE 40 MG Oral Capsule Delayed Release [Pharmacy Med Name: Omeprazole Dr 40 Mg Cap Nort] 90 capsule 0     Sig: Take 1 capsule (40 mg total) by mouth daily.        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 [facility])    [place] [facility], Marci Sotelo MD    Virtual Phone E/M    1 year ago Essential hypertension    [facility] Finesse Osorio MD    Office Visit    1 year ago 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 [facility])    [place] [facility], Marci Sotelo MD    Office Visit                           Recent Outpatient Visits              3 months ago 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    7 months ago Atrial fibrillation, unspecified type [facility])    [address] Marci Pichardo MD    Office Visit    1 year ago 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

## 2022-12-28 ENCOUNTER — NURSE TRIAGE (OUTPATIENT)
Dept: FAMILY MEDICINE CLINIC | Facility: CLINIC | Age: 87
End: 2022-12-28

## 2022-12-28 ENCOUNTER — HOSPITAL ENCOUNTER (OUTPATIENT)
Age: 87
Discharge: ANOTHER HOSPITAL | End: 2022-12-28
Payer: MEDICAID

## 2022-12-28 VITALS
SYSTOLIC BLOOD PRESSURE: 155 MMHG | RESPIRATION RATE: 20 BRPM | HEART RATE: 55 BPM | DIASTOLIC BLOOD PRESSURE: 61 MMHG | TEMPERATURE: 98 F | OXYGEN SATURATION: 100 %

## 2022-12-28 DIAGNOSIS — R73.9 HYPERGLYCEMIA: ICD-10-CM

## 2022-12-28 DIAGNOSIS — R42 DIZZINESS: Primary | ICD-10-CM

## 2022-12-28 LAB — GLUCOSE BLDC GLUCOMTR-MCNC: 234 MG/DL (ref 70–99)

## 2022-12-28 PROCEDURE — 99214 OFFICE O/P EST MOD 30 MIN: CPT | Performed by: NURSE PRACTITIONER

## 2022-12-28 PROCEDURE — 82962 GLUCOSE BLOOD TEST: CPT | Performed by: NURSE PRACTITIONER

## 2022-12-28 NOTE — ED INITIAL ASSESSMENT (HPI)
Pt's son states pt's sugar was 340 or 350 at around 3pm today. States did not give any medication after, contacted doctor's office, and was instructed to come to [address] [place] [address]. Pt c/o dizziness. Pt denies pain.

## 2023-02-13 ENCOUNTER — OFFICE VISIT (OUTPATIENT)
Dept: FAMILY MEDICINE CLINIC | Facility: CLINIC | Age: 88
End: 2023-02-13

## 2023-02-13 VITALS
WEIGHT: 139 LBS | TEMPERATURE: 99 F | BODY MASS INDEX: 25 KG/M2 | SYSTOLIC BLOOD PRESSURE: 153 MMHG | DIASTOLIC BLOOD PRESSURE: 54 MMHG | HEART RATE: 58 BPM

## 2023-02-13 DIAGNOSIS — E78.2 MIXED HYPERLIPIDEMIA: ICD-10-CM

## 2023-02-13 DIAGNOSIS — E11.9 TYPE 2 DIABETES MELLITUS WITHOUT RETINOPATHY (HCC): Primary | ICD-10-CM

## 2023-02-13 DIAGNOSIS — I10 ESSENTIAL HYPERTENSION: ICD-10-CM

## 2023-02-13 LAB
CARTRIDGE LOT#: ABNORMAL NUMERIC
HEMOGLOBIN A1C: 12.3 % (ref 4.3–5.6)

## 2023-02-13 PROCEDURE — 99214 OFFICE O/P EST MOD 30 MIN: CPT | Performed by: FAMILY MEDICINE

## 2023-02-13 PROCEDURE — 3077F SYST BP >= 140 MM HG: CPT | Performed by: FAMILY MEDICINE

## 2023-02-13 PROCEDURE — 83036 HEMOGLOBIN GLYCOSYLATED A1C: CPT | Performed by: FAMILY MEDICINE

## 2023-02-13 PROCEDURE — 3078F DIAST BP <80 MM HG: CPT | Performed by: FAMILY MEDICINE

## 2024-10-08 NOTE — PATIENT INSTRUCTIONS
Stop  Simvastatin  Start atorvastatin 40 mg daily  Get fasting cholesterol blood test in 8 weeks  Continue all meds including clopidogrel  Call if new symptoms Application Tool (Optional): Liquid Nitrogen Sprayer Consent: The patient's consent was obtained including but not limited to risks of crusting, scabbing, blistering, scarring, darker or lighter pigmentary change, recurrence, incomplete removal and infection. Show Aperture Variable?: Yes Render Post-Care Instructions In Note?: no Detail Level: Detailed Duration Of Freeze Thaw-Cycle (Seconds): 10 Post-Care Instructions: I reviewed with the patient in detail post-care instructions. Patient is to wear sunprotection, and avoid picking at any of the treated lesions. Pt may apply Vaseline to crusted or scabbing areas. Number Of Freeze-Thaw Cycles: 1 freeze-thaw cycle

## (undated) DIAGNOSIS — M54.2 NECK PAIN: ICD-10-CM

## (undated) DIAGNOSIS — I10 ESSENTIAL HYPERTENSION: ICD-10-CM

## (undated) DEVICE — Device: Brand: DEFENDO AIR/WATER/SUCTION AND BIOPSY VALVE

## (undated) DEVICE — FORCEP RADIAL JAW 4

## (undated) DEVICE — LINE MNTR ADLT SET O2 INTMD

## (undated) DEVICE — MEDI-VAC NON-CONDUCTIVE SUCTION TUBING 6MM X 1.8M (6FT.) L: Brand: CARDINAL HEALTH

## (undated) DEVICE — 35 ML SYRINGE REGULAR TIP: Brand: MONOJECT

## (undated) DEVICE — MASK PROC W/VISOR ANTIGLARE

## (undated) DEVICE — Device: Brand: CUSTOM PROCEDURE KIT

## (undated) NOTE — LETTER
[address]  Autorizacion para Procedimientos Invasivos    1.  Por el presente, autorizo al doctor APRIL, a mi(s) médico(s) y a Nichol Lukas designado annmarie asistente del doctor, a realizar la siguiente operación y/o procedimi 5. Doy mi consentimiento para que se tomen fotografías del futuro procedimiento(s) con los efectos de ayudar a progresar la medicina, la ciencia y/o la educación, siempre y cuando mi identidad permanezca confidencial. Si se ha grabado en video el procedimi _____________________________________________   Ramond Right responsable del paciente da de edad o inconsciente) (Relación con el paciente)   ___________________________________________________________________________________________________________________

## (undated) NOTE — LETTER
3/30/2020              Francis Kidd         Dear Rakesh Has,    This letter is to inform you that our office has made several attempts to reach you by phone without success.   We were attempting

## (undated) NOTE — LETTER
8/16/2019              Francis Kidd         Dear Arti Rice,    I reviewed the pathology report from the biopsies done during your recent upper endoscopy.  Based on the report, you have NO evidenc

## (undated) NOTE — LETTER
10/22/2020          To Whom It May Concern:    Debbie Marcelino is currently under my medical care and has prescriptions for the following medications:   Enalapril Maleate 10 MG Oral Tab  Metoprolol Succinate  MG Oral Tablet 24 Hr  atorvastatin 40 M

## (undated) NOTE — LETTER
June 25, 2019     [address] [place] [address]      Dear Arti Rice:    Below are the results from your recent visit:    Resulted Orders   EKG 12-LEAD    Narrative    ECG Report      Interpretation      --------------

## (undated) NOTE — Clinical Note
4/27/2017              Francis Kidd         To Whom it May Concern:    Zuly Robbins is under my care. He is on the following medications:         Taking?  Start Date End Date Provider     [address] [facility]

## (undated) NOTE — MR AVS SNAPSHOT
[address] [address]  [address]  [address] [facility]  [phone]               Thank you for choosing us for your health care visit with Nurse.   We are glad to serve you and happy to provide you with this summary of your vis Enalapril Maleate 10 MG Tabs   Take 1 tablet (10 mg total) by mouth 2 (two) times daily. Commonly known as:  VASOTEC           * Metoprolol Succinate ER 50 MG Tb24   Take 1 tablet (50 mg total) by mouth daily.  Take with 100mg tab for a total of 150mg da

## (undated) NOTE — LETTER
6/4/2018              Francis Kidd         Dear Petros Roberts,    This letter is to inform you that our office has made several attempts to reach you by phone without success.   We were attempting

## (undated) NOTE — MR AVS SNAPSHOT
[facility] [place] [address] [place]  [id number] [phone]               Thank you for choosing us for your health care visit with Addy Thompson MD.  We are glad to serve you and happy to provide you with this summary o 170/80 mmHg 72 5' 3\" (1.6 m) 149 lb (67.586 kg) 26.40 kg/m2         Current Medications          This list is accurate as of: 1/3/17  4:45 PM.  Always use your most recent med list.                Amitriptyline HCl 10 MG Tabs   TAKE 1-2 TABLETS BY MOUTH Return in about 6 months (around 7/3/2017).          Referral Information     Referral Order Referred to Address [facility] Phone Visits Status Diagnosis                                 Atherosclerosis of native coronary artery of native heart without angina pec

## (undated) NOTE — MR AVS SNAPSHOT
[facility] [address]  [address]  [address] [facility]  [phone]               Thank you for choosing us for your health care visit with Bernardino Martinez. DO Jonathan.   We are glad to serve you and happy to provide you with this aguiar Take 1 tablet (25 mg total) by mouth daily. Commonly known as:  HYDRODIURIL           Metoprolol Succinate  MG Tb24   Take 1 tablet (100 mg total) by mouth daily.  Take with 50 mg tablet for a total of 150 mg daily   What changed:  Another medicatio

## (undated) NOTE — MR AVS SNAPSHOT
[facility] [place] [address] [address]  [id number] [phone]               Thank you for choosing us for your health care visit with LYNNE García.   We are glad to serve you and happy to provide you with this summary Clopidogrel Bisulfate 75 MG Tabs   Take 1 tablet (75 mg total) by mouth once daily. Commonly known as:  PLAVIX           Enalapril Maleate 10 MG Tabs   Take 1 tablet (10 mg total) by mouth 2 (two) times daily.    Commonly known as:  VASOTEC           hyd

## (undated) NOTE — MR AVS SNAPSHOT
[address] [address]  [address]  [address] [facility]  [phone]               Thank you for choosing us for your health care visit with Nurse.   We are glad to serve you and happy to provide you with this summary of your vis Take 1 tablet (50 mg total) by mouth daily. Take with 100mg tab for a total of 150mg daily   Commonly known as: Toprol XL           * Metoprolol Succinate  MG Tb24   Take 1 tablet (100 mg total) by mouth daily.  Take with 50 mg tablet for a total of

## (undated) NOTE — LETTER
October 10, 2019    Feli Landrum DO  [address] [place] [id number]     Patient: Naila Escobar   YOB: 1930   Date of Visit: 10/10/2019       Dear Dr. Valencia Epps DO:    Thank you for referring Naila Escobar to me • Diabetes Sister         type 2   • Glaucoma Neg    • Macular degeneration Neg        Social History: Social History    Tobacco Use      Smoking status: Never Smoker      Smokeless tobacco: Never Used    Alcohol use: No    Drug use: No      Medications: Visual Acuity (Snellen - Linear)       Right Left    Dist sc 20/40 20/100    Dist ph sc NI 20/50    Near sc 20/100 20/70   Very difficult            Tonometry ([facility]care, 4:20 PM)       Right Left    Pressure 10 10          Pupils       Pupils    Right PERRL Meibomian gland dysfunction (MGD) of both eyes  Patient was instructed to use warm compresses to the eyelids twice a day everyday.     Instructions for warm compress use:   Patient should place wash compresses on both eyelids for 5 minutes every morning and CC: No Recipients    Document electronically generated by: Cordelia Tomas

## (undated) NOTE — LETTER
11/14/19      [address] [place] [address]      Dear Angie Collier records indicate that you have outstanding lab work and or testing that was ordered for you and has not yet been completed:  Orders Placed This Encou

## (undated) NOTE — LETTER
[address], [place]  Authorization for Invasive Procedures  1.  I hereby authorize Dr. Liz Chavarria , my physician and whomever may be designated as the doctor's assistant, to perform the following operation and/or procedure:  Lower Extre performed for the purposes of advancing medicine, science, and/or education, provided my identity is not revealed. If the procedure has been videotaped, the physician/surgeon will obtain the original videotape.  The hospital will not be responsible for stor My signature below affirms that prior to the time of the procedure, I have explained to the patient and/or his legal representative, the risks and benefits involved in the proposed treatment and any reasonable alternative to the proposed treatment.  I have

## (undated) NOTE — MR AVS SNAPSHOT
[address]  [address], [address]  [address] [facility]  [phone]               Thank you for choosing us for your health care visit with Nurse.   We are glad to serve you and happy to provide you with this summary of your vis Take 1 tablet (75 mg total) by mouth once daily. Commonly known as:  PLAVIX           Enalapril Maleate 10 MG Tabs   Take 1 tablet (10 mg total) by mouth 2 (two) times daily.    Commonly known as:  VASOTEC           hydrochlorothiazide 12.5 MG Caps   Take

## (undated) NOTE — Clinical Note
Markell Dodson  [address], [address]       01/03/2017        Patient: Antonio Woodall   YOB: 1930   Date of Visit: 1/3/2017       Dear  Dr. Deneen Hernandez,      Thank you for referring Antonio Woodall t

## (undated) NOTE — LETTER
7/31/2019          To Whom It May Concern:    Mickey Holder is currently under my medical care and was seen in office on 7/16/2019 with complaints of abdominal pain, poor appetite, history of colon polyps, and unexpected weight loss (25 pounds over 5-6

## (undated) NOTE — LETTER
1/22/2019              Francis Kidd         Dear Bee Sims records indicate that the lab tests, ALT, AST, and Lipid Panel ordered for you by Sonia Landrum MD have not been done.   I

## (undated) NOTE — LETTER
1/17/2020              Francis Ruiz        [address] [place] [address]         Dear Morgan Eldridge,      It was a pleasure to see you at our [place] [address] office.   Your xray tests showed arthritis in th